# Patient Record
Sex: MALE | Race: WHITE | Employment: UNEMPLOYED | URBAN - METROPOLITAN AREA
[De-identification: names, ages, dates, MRNs, and addresses within clinical notes are randomized per-mention and may not be internally consistent; named-entity substitution may affect disease eponyms.]

---

## 2017-10-01 ENCOUNTER — APPOINTMENT (OUTPATIENT)
Dept: GENERAL RADIOLOGY | Age: 47
End: 2017-10-01
Attending: NURSE PRACTITIONER
Payer: SELF-PAY

## 2017-10-01 ENCOUNTER — HOSPITAL ENCOUNTER (EMERGENCY)
Age: 47
Discharge: HOME | End: 2017-10-01
Attending: EMERGENCY MEDICINE
Payer: SELF-PAY

## 2017-10-01 VITALS
WEIGHT: 193.5 LBS | DIASTOLIC BLOOD PRESSURE: 84 MMHG | HEART RATE: 97 BPM | TEMPERATURE: 97.5 F | SYSTOLIC BLOOD PRESSURE: 128 MMHG | OXYGEN SATURATION: 98 % | HEIGHT: 70 IN | RESPIRATION RATE: 20 BRPM | BODY MASS INDEX: 27.7 KG/M2

## 2017-10-01 DIAGNOSIS — S30.0XXA CONTUSION OF LOWER BACK, INITIAL ENCOUNTER: Primary | ICD-10-CM

## 2017-10-01 PROCEDURE — 96372 THER/PROPH/DIAG INJ SC/IM: CPT

## 2017-10-01 PROCEDURE — 72100 X-RAY EXAM L-S SPINE 2/3 VWS: CPT

## 2017-10-01 PROCEDURE — 74011250637 HC RX REV CODE- 250/637: Performed by: NURSE PRACTITIONER

## 2017-10-01 PROCEDURE — 74011250636 HC RX REV CODE- 250/636: Performed by: NURSE PRACTITIONER

## 2017-10-01 PROCEDURE — 99283 EMERGENCY DEPT VISIT LOW MDM: CPT

## 2017-10-01 RX ORDER — HYDROMORPHONE HCL IN 0.9% NACL 50 MG/50ML
1 PLASTIC BAG, INJECTION (ML) INJECTION
Status: COMPLETED | OUTPATIENT
Start: 2017-10-01 | End: 2017-10-01

## 2017-10-01 RX ORDER — CYCLOBENZAPRINE HCL 10 MG
5 TABLET ORAL
Qty: 20 TAB | Refills: 0 | Status: SHIPPED | OUTPATIENT
Start: 2017-10-01 | End: 2019-04-21

## 2017-10-01 RX ORDER — CITALOPRAM 10 MG/1
20 TABLET ORAL DAILY
COMMUNITY
End: 2019-06-04

## 2017-10-01 RX ORDER — OXYCODONE AND ACETAMINOPHEN 5; 325 MG/1; MG/1
1 TABLET ORAL
Qty: 16 TAB | Refills: 0 | Status: SHIPPED | OUTPATIENT
Start: 2017-10-01 | End: 2019-04-21

## 2017-10-01 RX ORDER — CYCLOBENZAPRINE HCL 10 MG
10 TABLET ORAL
Status: COMPLETED | OUTPATIENT
Start: 2017-10-01 | End: 2017-10-01

## 2017-10-01 RX ORDER — ONDANSETRON 8 MG/1
8 TABLET, ORALLY DISINTEGRATING ORAL
Status: COMPLETED | OUTPATIENT
Start: 2017-10-01 | End: 2017-10-01

## 2017-10-01 RX ADMIN — ONDANSETRON 8 MG: 8 TABLET, ORALLY DISINTEGRATING ORAL at 12:09

## 2017-10-01 RX ADMIN — Medication 1 MG: at 12:10

## 2017-10-01 RX ADMIN — CYCLOBENZAPRINE HYDROCHLORIDE 10 MG: 10 TABLET, FILM COATED ORAL at 12:09

## 2017-10-01 NOTE — DISCHARGE INSTRUCTIONS
Low Back Contusion: Care Instructions  Your Care Instructions  Contusion is the medical term for a bruise. When you have a low back bruise, it's often caused by a direct blow or an impact, such as falling against a counter or table. Bruises are common sports injuries. Most people think of a bruise as a black-and-blue spot. This happens when small blood vessels get torn and leak blood under the skin. But bones, muscles, and organs can also get bruised. If these deep tissues are damaged, you may not always see a bruise. The doctor will examine your bruise. You may also have tests to make sure you do not have a more serious injury, such as a broken bone or nerve damage. Tests may include X-rays or other imaging tests like a CT scan or MRI. Low back bruises may cause pain and swelling. But if there is no serious damage, they will often get better with home treatment in several days to a few weeks. The doctor has checked you carefully, but problems can develop later. If you notice any problems or new symptoms, get medical treatment right away. Follow-up care is a key part of your treatment and safety. Be sure to make and go to all appointments, and call your doctor if you are having problems. It's also a good idea to know your test results and keep a list of the medicines you take. How can you care for yourself at home? · Put ice or a cold pack on the sore area for 10 to 20 minutes at a time to stop swelling. Put a thin cloth between the ice pack and your skin. · Be safe with medicines. Read and follow all instructions on the label. ¨ If the doctor gave you a prescription medicine for pain, take it as prescribed. ¨ If you are not taking a prescription pain medicine, ask your doctor if you can take an over-the-counter medicine. · For the first day or two of pain, take it easy. But as soon as possible, get back to your normal daily life and activities. · Get gentle exercise, such as walking.  Movement keeps your spine flexible and helps your muscles stay strong. When should you call for help? Call 911 anytime you think you may need emergency care. For example, call if:  · You are unable to move a leg at all. Call your doctor now or seek immediate medical care if:  · You have new or worse symptoms in your legs or buttocks. Symptoms may include:  ¨ Numbness or tingling. ¨ Weakness. ¨ Pain. · You lose bladder or bowel control. · You have blood in your urine. Watch closely for changes in your health, and be sure to contact your doctor if:  · You do not get better as expected. Where can you learn more? Go to http://neela-ryan.info/. Enter V337 in the search box to learn more about \"Low Back Contusion: Care Instructions. \"  Current as of: March 20, 2017  Content Version: 11.3  © 5576-8335 [employer]. Care instructions adapted under license by [employer] (which disclaims liability or warranty for this information). If you have questions about a medical condition or this instruction, always ask your healthcare professional. [address] any warranty or liability for your use of this information. [employer] Activation    Thank you for requesting access to [employer]. Please follow the instructions below to securely access and download your online medical record. [employer] allows you to send messages to your doctor, view your test results, renew your prescriptions, schedule appointments, and more. How Do I Sign Up? 1. In your internet browser, go to [email or web address]  2. Click on the First Time User? Click Here link in the Sign In box. You will be redirect to the New Member Sign Up page. 3. Enter your [employer] Access Code exactly as it appears below. You will not need to use this code after youve completed the sign-up process. If you do not sign up before the expiration date, you must request a new code.     [employer] Access Code: [email or web address]  Expires: 2017 12:12 PM (This is the date your [facility] access code will )    4. Enter the last four digits of your Social Security Number (xxxx) and Date of Birth (mm/dd/yyyy) as indicated and click Submit. You will be taken to the next sign-up page. 5. Create a [facility] ID. This will be your [facility] login ID and cannot be changed, so think of one that is secure and easy to remember. 6. Create a [facility] password. You can change your password at any time. 7. Enter your Password Reset Question and Answer. This can be used at a later time if you forget your password. 8. Enter your e-mail address. You will receive e-mail notification when new information is available in [address] 9. Click Sign Up. You can now view and download portions of your medical record. 10. Click the Download Summary menu link to download a portable copy of your medical information. Additional Information    If you have questions, please visit the Frequently Asked Questions section of the [facility] website at [email or web address] Remember, [facility] is NOT to be used for urgent needs. For medical emergencies, dial 911.

## 2017-10-01 NOTE — ED PROVIDER NOTES
HPI Comments: Ike Daugherty is a 50 y.o. Male with PMHx Anxiety, Panic attacks, Rectal Bleeding, fibromyalgia and neuropathy who presents to the ED with c/o lower back pain after falling in the shower this morning at 07:00 AM. Patient verbalizes he slipped while washing his feet. Patient states, \"my lower back hit the back edge of the bathtub. \"   Denies hitting head, LOC, neck pain/stiffness. Admits he took Tylenol for pain, without relief. Denies back pain radiating down leg/legs. Denies loss of bowel/bladder or unsteady gait. Cara CP,SOB, abdominal pain, N/V/D, flank pain, urinary sx's, or any other concerns. The history is provided by the patient. Past Medical History:   Diagnosis Date    Anxiety     Cervicalgia     Fibromyalgia     GERD (gastroesophageal reflux disease)     Lower back pain     and upper    Neuropathy (HCC)     Panic attacks     Rectal bleeding 5-6 years ago    has had cy and EGD by Dr. Natalia Lafleur. CY showed per patient internal hemorrhoids and EGD showed GERD       Past Surgical History:   Procedure Laterality Date    HX APPENDECTOMY      HX LIPOMA RESECTION      HX OTHER SURGICAL      excision of lipoma upper back         Family History:   Problem Relation Age of Onset    Diabetes Mother     Hypertension Mother     Stroke Mother     Coronary Artery Disease Mother     Diabetes Father     Heart Surgery Father      Patient has had heart valve replacement       Social History     Social History    Marital status: LEGALLY      Spouse name: N/A    Number of children: N/A    Years of education: N/A     Occupational History    Not on file. Social History Main Topics    Smoking status: Current Every Day Smoker     Packs/day: 1.00     Years: 30.00    Smokeless tobacco: Never Used      Comment: Pt counseled to stop smoking.     Alcohol use No      Comment: less than social    Drug use: No    Sexual activity: Yes     Partners: Female     Birth control/ protection: None     Other Topics Concern    Not on file     Social History Narrative         ALLERGIES: Pcn [penicillins] and Ibuprofen    Review of Systems   Constitutional: Negative. HENT: Negative. Eyes: Negative. Respiratory: Negative. Cardiovascular: Negative. Gastrointestinal: Negative. Genitourinary: Negative. Musculoskeletal: Positive for back pain. Negative for neck pain and neck stiffness. Skin: Negative. Neurological: Negative. All other systems reviewed and are negative. Vitals:    10/01/17 1130   BP: 128/84   Pulse: 97   Resp: 20   Temp: 97.5 °F (36.4 °C)   SpO2: 98%   Weight: 87.8 kg (193 lb 8 oz)   Height: 5' 10\" (1.778 m)            Physical Exam   Constitutional: He is oriented to person, place, and time. He appears well-developed and well-nourished. No distress. HENT:   Right Ear: Hearing, tympanic membrane, external ear and ear canal normal.   Left Ear: Hearing, tympanic membrane, external ear and ear canal normal.   Nose: Nose normal.   Mouth/Throat: Uvula is midline, oropharynx is clear and moist and mucous membranes are normal. No oropharyngeal exudate. Neck: Normal range of motion and full passive range of motion without pain. Neck supple. Cardiovascular: Normal rate, regular rhythm, normal heart sounds and intact distal pulses. Exam reveals no gallop and no friction rub. No murmur heard. Pulmonary/Chest: Effort normal and breath sounds normal. No respiratory distress. He has no wheezes. He has no rales. He exhibits no tenderness. Abdominal: Soft. Normal appearance and bowel sounds are normal. He exhibits no shifting dullness, no distension and no mass. There is no tenderness. There is no rigidity, no rebound, no guarding, no CVA tenderness, no tenderness at McBurney's point and negative Dominguez's sign. Musculoskeletal: Normal range of motion.         Cervical back: Normal.        Thoracic back: Normal.        Lumbar back: He exhibits tenderness (mild ecchymosis to L3 and L4) and bony tenderness. He exhibits normal range of motion, no swelling and no edema. Negative SLR. No foot drop   Neurological: He is alert and oriented to person, place, and time. Skin: Skin is warm and dry. He is not diaphoretic. Psychiatric: He has a normal mood and affect. His behavior is normal. Judgment and thought content normal.   Nursing note and vitals reviewed. MDM  Number of Diagnoses or Management Options  Contusion of lower back, initial encounter:   Diagnosis management comments: DDX:  Muscular Strain, Spinal Stenosis, Sciatica, Compression FX, DJD, Spondylolysis, Inflammatory Spondyloarthropathy, Cauda Equina Syndrome    Clinical Impression/Plan: Patient stable condition. Reviewed x-ray with patient. Answered questions. Will prescribe Percocet, Flexeril, and refer to Ortho. Follow up with PCP in 2-4 days. If symptoms worsen or have any other concerns, return to ER. Patient verbalizes d/c instructions.         Amount and/or Complexity of Data Reviewed  Tests in the radiology section of CPT®: ordered and reviewed  Review and summarize past medical records: yes    Risk of Complications, Morbidity, and/or Mortality  Presenting problems: low  Diagnostic procedures: low  Management options: low      ED Course       Procedures    Vitals:  Patient Vitals for the past 12 hrs:   Temp Pulse Resp BP SpO2   10/01/17 1130 97.5 °F (36.4 °C) 97 20 128/84 98 %       Medications ordered:   Medications   HYDROmorphone in NS (DILAUDID) injection 1 mg (1 mg IntraMUSCular Given 10/1/17 1210)   cyclobenzaprine (FLEXERIL) tablet 10 mg (10 mg Oral Given 10/1/17 1209)   ondansetron (ZOFRAN ODT) tablet 8 mg (8 mg Oral Given 10/1/17 1209)

## 2017-11-05 ENCOUNTER — APPOINTMENT (OUTPATIENT)
Dept: GENERAL RADIOLOGY | Age: 47
End: 2017-11-05
Attending: NURSE PRACTITIONER
Payer: SELF-PAY

## 2017-11-05 ENCOUNTER — HOSPITAL ENCOUNTER (EMERGENCY)
Age: 47
Discharge: HOME | End: 2017-11-05
Attending: EMERGENCY MEDICINE
Payer: SELF-PAY

## 2017-11-05 VITALS
RESPIRATION RATE: 22 BRPM | OXYGEN SATURATION: 96 % | SYSTOLIC BLOOD PRESSURE: 140 MMHG | DIASTOLIC BLOOD PRESSURE: 94 MMHG | TEMPERATURE: 97.5 F | HEART RATE: 107 BPM

## 2017-11-05 DIAGNOSIS — G89.29 OTHER CHRONIC PAIN: Primary | ICD-10-CM

## 2017-11-05 PROCEDURE — 99282 EMERGENCY DEPT VISIT SF MDM: CPT

## 2017-11-05 RX ORDER — ONDANSETRON 2 MG/ML
4 INJECTION INTRAMUSCULAR; INTRAVENOUS
Status: DISCONTINUED | OUTPATIENT
Start: 2017-11-05 | End: 2017-11-05 | Stop reason: HOSPADM

## 2017-11-05 RX ORDER — MORPHINE SULFATE 8 MG/ML
4 INJECTION, SOLUTION INTRAMUSCULAR; INTRAVENOUS
Status: DISCONTINUED | OUTPATIENT
Start: 2017-11-05 | End: 2017-11-05 | Stop reason: HOSPADM

## 2017-11-05 NOTE — DISCHARGE INSTRUCTIONS
Chronic Pain: Care Instructions  Your Care Instructions    Chronic pain is pain that lasts a long time (months or even years) and may or may not have a clear cause. It is different from acute pain, which usually does have a clear cause-like an injury or illness-and gets better over time. Chronic pain:  · Lasts over time but may vary from day to day. · Does not go away despite efforts to end it. · May disrupt your sleep and lead to fatigue. · May cause depression or anxiety. · May make your muscles tense, causing more pain. · Can disrupt your work, hobbies, home life, and relationships with friends and family. Chronic pain is a very real condition. It is not just in your head. Treatment can help and usually includes several methods used together, such as medicines, physical therapy, exercise, and other treatments. Learning how to relax and changing negative thought patterns can also help you cope. Chronic pain is complex. Taking an active role in your treatment will help you better manage your pain. Tell your doctor if you have trouble dealing with your pain. You may have to try several things before you find what works best for you. Follow-up care is a key part of your treatment and safety. Be sure to make and go to all appointments, and call your doctor if you are having problems. It's also a good idea to know your test results and keep a list of the medicines you take. How can you care for yourself at home? · Pace yourself. Break up large jobs into smaller tasks. Save harder tasks for days when you have less pain, or go back and forth between hard tasks and easier ones. Take rest breaks. · Relax, and reduce stress. Relaxation techniques such as deep breathing or meditation can help. · Keep moving. Gentle, daily exercise can help reduce pain over the long run. Try low- or no-impact exercises such as walking, swimming, and stationary biking. Do stretches to stay flexible.   · Try heat, cold packs, and massage. · Get enough sleep. Chronic pain can make you tired and drain your energy. Talk with your doctor if you have trouble sleeping because of pain. · Think positive. Your thoughts can affect your pain level. Do things that you enjoy to distract yourself when you have pain instead of focusing on the pain. See a movie, read a book, listen to music, or spend time with a friend. · If you think you are depressed, talk to your doctor about treatment. · Keep a daily pain diary. Record how your moods, thoughts, sleep patterns, activities, and medicine affect your pain. You may find that your pain is worse during or after certain activities or when you are feeling a certain emotion. Having a record of your pain can help you and your doctor find the best ways to treat your pain. · Take pain medicines exactly as directed. ¨ If the doctor gave you a prescription medicine for pain, take it as prescribed. ¨ If you are not taking a prescription pain medicine, ask your doctor if you can take an over-the-counter medicine. Reducing constipation caused by pain medicine  · Include fruits, vegetables, beans, and whole grains in your diet each day. These foods are high in fiber. · Drink plenty of fluids, enough so that your urine is light yellow or clear like water. If you have kidney, heart, or liver disease and have to limit fluids, talk with your doctor before you increase the amount of fluids you drink. · If your doctor recommends it, get more exercise. Walking is a good choice. Bit by bit, increase the amount you walk every day. Try for at least 30 minutes on most days of the week. · Schedule time each day for a bowel movement. A daily routine may help. Take your time and do not strain when having a bowel movement. When should you call for help? Call your doctor now or seek immediate medical care if:  ? · Your pain gets worse or is out of control.    ? · You feel down or blue, or you do not enjoy things like you once did. You may be depressed, which is common in people with chronic pain. Depression can be treated. ? · You have vomiting or cramps for more than 2 hours. ? Watch closely for changes in your health, and be sure to contact your doctor if:  ? · You cannot sleep because of pain. ? · You are very worried or anxious about your pain. ? · You have trouble taking your pain medicine. ? · You have any concerns about your pain medicine. ? · You have trouble with bowel movements, such as:  ¨ No bowel movement in 3 days. ¨ Blood in the anal area, in your stool, or on the toilet paper. ¨ Diarrhea for more than 24 hours. Where can you learn more? Go to http://neela-ryan.info/. Enter N004 in the search box to learn more about \"Chronic Pain: Care Instructions. \"  Current as of: October 14, 2016  Content Version: 11.4  © 3746-4946 [employer]. Care instructions adapted under license by [employer] (which disclaims liability or warranty for this information). If you have questions about a medical condition or this instruction, always ask your healthcare professional. [place] [address] any warranty or liability for your use of this information.

## 2017-11-05 NOTE — ED TRIAGE NOTES
Pt arrived to ED with c/o neck, back, and hip pain after falling off of ladder approximately 8 feet in the air while cleaning gutters. Pt states that he fell onto an air conditioner unit and the ladder then fell on top of him, hitting his back.   Pt has history of chronic back pain, with bulging disk at C3, C4, C5, L5 S1.

## 2017-11-05 NOTE — ED PROVIDER NOTES
HPI Comments: Pt with a history of frequent visits to emergency departments per pt to get pain meds since he left his pain management in February. Pt states he fell yesterday while cleaning the gutters at his house. Pt has been in the room walking around in no distress, no limp. Patient is a 50 y.o. male presenting with back pain. The history is provided by the patient. No  was used. Back Pain    This is a chronic problem. The current episode started more than 1 week ago. The problem has not changed since onset. The problem occurs constantly. Patient reports not work related injury. The pain is associated with a fall. The pain is present in the middle back. The pain does not radiate. The pain is at a severity of 8/10 (no grimacing moving without difficulty). The pain is mild. The symptoms are aggravated by bending. Pertinent negatives include no chest pain, no fever, no numbness, no headaches and no dysuria. He has tried nothing for the symptoms. Past Medical History:   Diagnosis Date    Anxiety     Cervicalgia     Fibromyalgia     GERD (gastroesophageal reflux disease)     Lower back pain     and upper    Neuropathy     Panic attacks     Rectal bleeding 5-6 years ago    has had cy and EGD by Dr. Pito Lazcano. CY showed per patient internal hemorrhoids and EGD showed GERD       Past Surgical History:   Procedure Laterality Date    HX APPENDECTOMY      HX LIPOMA RESECTION      HX OTHER SURGICAL      excision of lipoma upper back         Family History:   Problem Relation Age of Onset    Diabetes Mother     Hypertension Mother     Stroke Mother     Coronary Artery Disease Mother     Diabetes Father     Heart Surgery Father      Patient has had heart valve replacement       Social History     Social History    Marital status: LEGALLY      Spouse name: N/A    Number of children: N/A    Years of education: N/A     Occupational History    Not on file.      Social History Main Topics    Smoking status: Current Every Day Smoker     Packs/day: 1.00     Years: 30.00    Smokeless tobacco: Never Used      Comment: Pt counseled to stop smoking.  Alcohol use No      Comment: less than social    Drug use: No    Sexual activity: Yes     Partners: Female     Birth control/ protection: None     Other Topics Concern    Not on file     Social History Narrative         ALLERGIES: Pcn [penicillins] and Ibuprofen    Review of Systems   Constitutional: Negative for chills and fever. Respiratory: Negative for shortness of breath. Cardiovascular: Negative for chest pain. Genitourinary: Negative for dysuria, frequency and hematuria. Musculoskeletal: Positive for back pain. Negative for gait problem, joint swelling, neck pain and neck stiffness. Neurological: Negative for dizziness, seizures, numbness and headaches. All other systems reviewed and are negative. Vitals:    11/05/17 0137   BP: (!) 140/94   Pulse: (!) 107   Resp: 22   Temp: 97.5 °F (36.4 °C)   SpO2: 96%            Physical Exam   Constitutional: He is oriented to person, place, and time. He appears well-developed and well-nourished. HENT:   Head: Normocephalic and atraumatic. Eyes: Conjunctivae and EOM are normal. Pupils are equal, round, and reactive to light. Neck: Normal range of motion. Neck supple. Cardiovascular: Normal rate and regular rhythm. Pulmonary/Chest: Effort normal and breath sounds normal.   Abdominal: Soft. Bowel sounds are normal.   Musculoskeletal: Normal range of motion. He exhibits tenderness. Back:    Tender to mid back area, small faint bruising to posterior lateral ribs 11/12   Neurological: He is alert and oriented to person, place, and time. He has normal reflexes. Skin: Skin is warm and dry. Psychiatric: He has a normal mood and affect. His behavior is normal. Judgment and thought content normal.   Nursing note and vitals reviewed.        MDM  Number of Diagnoses or Management Options  Other chronic pain: minor  Diagnosis management comments: Pt with a history of chronic pain and repeated ER visits here, [place] and pt states in [place] [address]. Pt states he stopped going to his pain management in Feb because it was too expensive and he has been going to [facility] for his pain meds since. Pt was informed of the narcotic policy and that the ED has a zero tolerance for prescribing narcotics for chronic pain and I would be able to treat him in the ED xray his ribs but I would not be able to provide an rx for controlled substance. Pt agreed and I left the room after exam.  No long after the pt pulled his iv, and stated that he did not want xrays and he was leaving. I discharged the pt with instructions to follow up with [facility]. Pt refused the pain med and xray. Since he was ambulating in no distress, no limp no grimacing in pain and exam was normal except a faint bruise to his mid back I fell discharge and follow up is appropriate. Pt ambulated in no distress and quickly from the ed. Also according to [place] PNP pt has multiple rx for controlled substance from multiple providers in the past few months.  This confirms his accounting of going to multiple EDs for pain meds        Amount and/or Complexity of Data Reviewed  Tests in the radiology section of CPT®: ordered    Risk of Complications, Morbidity, and/or Mortality  Presenting problems: minimal  Diagnostic procedures: minimal  Management options: minimal    Patient Progress  Patient progress: stable    ED Course       Procedures            Vitals:  Patient Vitals for the past 12 hrs:   Temp Pulse Resp BP SpO2   11/05/17 0137 97.5 °F (36.4 °C) (!) 107 22 (!) 140/94 96 %       Medications ordered:   Medications   morphine injection 4 mg (not administered)   ondansetron (ZOFRAN) injection 4 mg (not administered)               X-Ray, CT or other radiology findings or impressions:  XR RIBS RT W PA CXR MIN 3 V    (Results Pending)     refused         Reevaluation of patient:   I have reassessed the patient. Patient is feeling better and is asking to go home without xray or meds    Disposition:    Diagnosis:   1. Other chronic pain        Disposition: to Home      Follow-up Information     Follow up With Details Comments [address] In 2 days  [address] [address]  [phone]           Discharge Medication List as of 11/5/2017  4:16 AM      CONTINUE these medications which have NOT CHANGED    Details   oxyCODONE-acetaminophen (PERCOCET) 5-325 mg per tablet Take 1 Tab by mouth every four (4) hours as needed for Pain. Max Daily Amount: 6 Tabs., Print, Disp-16 Tab, R-0      cyclobenzaprine (FLEXERIL) 10 mg tablet Take 0.5 Tabs by mouth three (3) times daily as needed for Muscle Spasm(s). , Print, Disp-20 Tab, R-0      citalopram (CELEXA) 10 mg tablet Take 20 mg by mouth daily. , Historical Med      ALPRAZolam (XANAX) 1 mg tablet Take 1 Tab by mouth three (3) times daily as needed for Anxiety. Max Daily Amount: 3 mg., Print, Disp-42 Tab, R-0      DULoxetine (CYMBALTA) 20 mg capsule Take 30 mg by mouth daily. , Historical Med             Return to the ER if you are unable to obtain referral as directed. Dalton Tom's  results have been reviewed with him. He has been counseled regarding his diagnosis, treatment, and plan. He verbally conveys understanding and agreement of the signs, symptoms, diagnosis, treatment and prognosis and additionally agrees to follow up as discussed. He also agrees with the care-plan and conveys that all of his questions have been answered. I have also provided discharge instructions for him that include: educational information regarding their diagnosis and treatment, and list of reasons why they would want to return to the ED prior to their follow-up appointment, should his condition change.       Amita Gray FNP-C

## 2017-11-05 NOTE — ED NOTES
Pt attempting to elope at this time, found existing the front entrance. Pt pulled out his IV before attempting to leave, pt returned to room 5, clean dry bandage applied to IV site. Pt upset about non-opioid policy for his chronic neck and back pain. Pt was uncooperative, and refused medication and xray. Pt wants to be discharged Osmar Lee NP notified, returned to bedside to speak with pt, pt states he wants to leave. Pt discharged, ambulated without any distress.

## 2019-04-21 ENCOUNTER — HOSPITAL ENCOUNTER (EMERGENCY)
Age: 49
Discharge: HOME | End: 2019-04-21
Attending: EMERGENCY MEDICINE | Admitting: EMERGENCY MEDICINE
Payer: MEDICAID

## 2019-04-21 VITALS
HEART RATE: 107 BPM | DIASTOLIC BLOOD PRESSURE: 82 MMHG | OXYGEN SATURATION: 98 % | RESPIRATION RATE: 16 BRPM | SYSTOLIC BLOOD PRESSURE: 121 MMHG | TEMPERATURE: 97.7 F

## 2019-04-21 DIAGNOSIS — H72.92 TYMPANIC MEMBRANE RUPTURE, LEFT: Primary | ICD-10-CM

## 2019-04-21 PROCEDURE — 99282 EMERGENCY DEPT VISIT SF MDM: CPT

## 2019-04-21 RX ORDER — CEFDINIR 300 MG/1
300 CAPSULE ORAL 2 TIMES DAILY
Qty: 14 CAP | Refills: 0 | Status: SHIPPED | OUTPATIENT
Start: 2019-04-21 | End: 2019-04-28

## 2019-04-21 RX ORDER — TRAMADOL HYDROCHLORIDE 50 MG/1
50 TABLET ORAL
Qty: 10 TAB | Refills: 0 | Status: SHIPPED | OUTPATIENT
Start: 2019-04-21 | End: 2019-04-26

## 2019-04-21 RX ORDER — ACETAMINOPHEN 325 MG/1
650 TABLET ORAL
Qty: 20 TAB | Refills: 0 | Status: SHIPPED | OUTPATIENT
Start: 2019-04-21 | End: 2019-12-16

## 2019-04-21 NOTE — ED PROVIDER NOTES
2:11 PM  
46 y.o. male presents to ED C/O left ear pain. She reports this morning he sneezed and felt a pop in his left ear, excruciating pain occurred after the pop and any noted blood from his left ear. Patient reports his equilibrium also feels a little bit off since this occurred. Patient reports decreased hearing from left ear. Patient reports for the last couple days he is felt like fluid was sloshing around his left ear. Patient reports he smokes 1 pack/day. Patient denies any other symptoms or complaints. Past Medical History:  
Diagnosis Date  Anxiety  Cervicalgia  Fibromyalgia  GERD (gastroesophageal reflux disease)  Lower back pain   
 and upper  Neuropathy  Panic attacks  Rectal bleeding 5-6 years ago  
 has had cy and EGD by Dr. Pito Lazcano. CY showed per patient internal hemorrhoids and EGD showed GERD Past Surgical History:  
Procedure Laterality Date  HX APPENDECTOMY  HX LIPOMA RESECTION    
 HX OTHER SURGICAL    
 excision of lipoma upper back Family History:  
Problem Relation Age of Onset  Diabetes Mother  Hypertension Mother  Stroke Mother  Coronary Artery Disease Mother  Diabetes Father  Heart Surgery Father Patient has had heart valve replacement Social History Socioeconomic History  Marital status: LEGALLY  Spouse name: Not on file  Number of children: Not on file  Years of education: Not on file  Highest education level: Not on file Occupational History  Not on file Social Needs  Financial resource strain: Not on file  Food insecurity:  
  Worry: Not on file Inability: Not on file  Transportation needs:  
  Medical: Not on file Non-medical: Not on file Tobacco Use  Smoking status: Current Every Day Smoker Packs/day: 1.00 Years: 30.00 Pack years: 30.00  Smokeless tobacco: Never Used  Tobacco comment: Pt counseled to stop smoking. Substance and Sexual Activity  Alcohol use: No  
  Alcohol/week: 0.0 oz  
  Comment: less than social  
 Drug use: No  
 Sexual activity: Yes  
  Partners: Female Birth control/protection: None Lifestyle  Physical activity:  
  Days per week: Not on file Minutes per session: Not on file  Stress: Not on file Relationships  Social connections:  
  Talks on phone: Not on file Gets together: Not on file Attends Congregation service: Not on file Active member of club or organization: Not on file Attends meetings of clubs or organizations: Not on file Relationship status: Not on file  Intimate partner violence:  
  Fear of current or ex partner: Not on file Emotionally abused: Not on file Physically abused: Not on file Forced sexual activity: Not on file Other Topics Concern  Not on file Social History Narrative  Not on file ALLERGIES: Pcn [penicillins] and Ibuprofen Review of Systems Constitutional: Negative for activity change, appetite change, chills, fatigue and fever. HENT: Positive for ear discharge and ear pain. Negative for congestion, rhinorrhea and sore throat. Eyes: Negative for pain, discharge, redness and itching. Respiratory: Negative for cough, chest tightness, shortness of breath and wheezing. Cardiovascular: Negative for chest pain and palpitations. Gastrointestinal: Negative for abdominal pain, blood in stool, constipation, diarrhea, nausea and vomiting. Endocrine: Negative for polyuria. Genitourinary: Negative for discharge, dysuria, flank pain, hematuria, penile pain and testicular pain. Musculoskeletal: Negative for back pain, joint swelling and neck pain. Skin: Negative for rash and wound. Allergic/Immunologic: Negative for immunocompromised state. Neurological: Negative for dizziness, weakness, light-headedness, numbness and headaches. Hematological: Negative for adenopathy. Psychiatric/Behavioral: Negative for agitation and confusion. The patient is not nervous/anxious. All other systems reviewed and are negative. Vitals:  
 04/21/19 1413 BP: 121/82 Pulse: (!) 107 Resp: 16 Temp: 97.7 °F (36.5 °C) SpO2: 98% Physical Exam  
Constitutional: He is oriented to person, place, and time. He appears well-developed and well-nourished. patient appears mildly uncomfortable. HENT:  
Head: Atraumatic. Right Ear: Hearing, tympanic membrane, external ear and ear canal normal. No mastoid tenderness. Left Ear: No mastoid tenderness. Decreased hearing is noted. Ears: 
 
Mouth/Throat: Uvula is midline and oropharynx is clear and moist.  
Neck: Normal range of motion. Cardiovascular: Normal rate, regular rhythm and intact distal pulses. Pulmonary/Chest: Effort normal and breath sounds normal. No stridor. No respiratory distress. He has no wheezes. He has no rales. Musculoskeletal: Normal range of motion. Neurological: He is alert and oriented to person, place, and time. He exhibits normal muscle tone. Coordination normal.  
Skin: Skin is warm and dry. He is not diaphoretic. No erythema. Nursing note and vitals reviewed. MDM Number of Diagnoses or Management Options Tympanic membrane rupture, left:  
Diagnosis management comments: Clinical impression: Tympanic rupture Physical exam is consistent with left tympanic rupture probably secondary to acute otitis media due to patient reporting feeling like fluid was sloshing around his inner ear for last couple days. Will discharge with p.o. antibiotics, discussed the importance of keeping water out of his ear. Short course of pain medication as needed. Patient referred to otolaryngology for further evaluation as needed. Patient educated to return to the ED for any worsening symptoms. Patient denies questions. Procedures RESULTS: 
 
 No orders to display Labs Reviewed - No data to display No results found for this or any previous visit (from the past 12 hour(s)). PROGRESS NOTE:  
2:12 PM  
Initial assessment completed. Written by Parth HERNANDEZ 
 
DISCHARGE NOTE: 
2:37 PM  
Dalton Tom's  results have been reviewed with him. He has been counseled regarding his diagnosis, treatment, and plan. He verbally conveys understanding and agreement of the signs, symptoms, diagnosis, treatment and prognosis and additionally agrees to follow up as discussed. He also agrees with the care-plan and conveys that all of his questions have been answered. I have also provided discharge instructions for him that include: educational information regarding their diagnosis and treatment, and list of reasons why they would want to return to the ED prior to their follow-up appointment, should his condition change. CLINICAL IMPRESSION: 
 
1. Tympanic membrane rupture, left AFTER VISIT PLAN: 
 
Current Discharge Medication List  
  
START taking these medications Details  
cefdinir (OMNICEF) 300 mg capsule Take 1 Cap by mouth two (2) times a day for 7 days. Qty: 14 Cap, Refills: 0  
  
acetaminophen (TYLENOL) 325 mg tablet Take 2 Tabs by mouth every four (4) hours as needed for Pain. Qty: 20 Tab, Refills: 0  
  
traMADol (ULTRAM) 50 mg tablet Take 1 Tab by mouth every six (6) hours as needed for Pain for up to 5 days. Max Daily Amount: 200 mg. Qty: 10 Tab, Refills: 0 Associated Diagnoses: Tympanic membrane rupture, left STOP taking these medications  
  
 oxyCODONE-acetaminophen (PERCOCET) 5-325 mg per tablet Comments:  
Reason for Stopping:   
   
 cyclobenzaprine (FLEXERIL) 10 mg tablet Comments:  
Reason for Stopping: Follow-up Information Follow up With Specialties Details Why Contact Info  [facility] Department of Otolaryngology  Schedule an appointment as soon as possible for a visit in 3 days Further evaluation [address] [facility] 
[phone] [facility]  Schedule an appointment as soon as possible for a visit in 3 days Further evaluation [address] [facility] [address] [facility] 
[phone] Written by Sophy ABDULC

## 2019-04-21 NOTE — DISCHARGE INSTRUCTIONS
Patient Education        Keeping Ears Dry: Care Instructions  Your Care Instructions  Your doctor wants you to keep water from getting into your ears. You may need to do this because of a ruptured eardrum, an ear infection, or other ear problems. Follow-up care is a key part of your treatment and safety. Be sure to make and go to all appointments, and call your doctor if you are having problems. It's also a good idea to know your test results and keep a list of the medicines you take. How can you care for yourself at home? · Take baths until your doctor says you can take showers again. Avoid getting water in the ear until after the problem clears up. Ask your doctor if you should use earplugs to keep water out of your ears. · Do not swim until your doctor says you can. · If you get water in your ears, turn your head to each side and pull the earlobe in different directions. This will help the water run out. If your ears are still wet, use a hair dryer set on the lowest heat. Hold the dryer several inches from your ear. · Use your medicines exactly as prescribed. Call your doctor if you think you are having a problem with your medicine. Do not put drops in your ears unless your doctor prescribes them. When should you call for help? Watch closely for changes in your health, and be sure to contact your doctor if you have any problems. Where can you learn more? Go to http://neela-ryan.info/. Enter A256 in the search box to learn more about \"Keeping Ears Dry: Care Instructions. \"  Current as of: March 27, 2018  Content Version: 11.9  © 9258-6965 [employer]. Care instructions adapted under license by [employer] (which disclaims liability or warranty for this information).  If you have questions about a medical condition or this instruction, always ask your healthcare professional. [place] [address] any warranty or liability for your use of this information. Patient Education        Perforated Eardrum: Care Instructions  Your Care Instructions    A tear or hole in the membrane of the middle ear is called a perforated or ruptured eardrum. This can happen if an infection builds up inside the ear or if the eardrum gets injured. You may find it hard to hear out of that ear or may hear a buzzing sound. You may have an earache or have fluids that drain from the ear. Your eardrum should heal on its own in a few weeks, and you should hear normally then. If you have an infection, your doctor may prescribe antibiotics. You may need pain relief medicine for your earache. Your doctor will check to see if your eardrum has healed. If not, you may need surgery to repair the eardrum. Follow-up care is a key part of your treatment and safety. Be sure to make and go to all appointments, and call your doctor if you are having problems. It's also a good idea to know your test results and keep a list of the medicines you take. How can you care for yourself at home? · If your doctor prescribed antibiotics, take them as directed. Do not stop taking them just because you feel better. You need to take the full course of antibiotics. · Take an over-the-counter pain medicine, such as acetaminophen (Tylenol), ibuprofen (Advil, Motrin), or naproxen (Aleve), as needed. Read and follow all instructions on the label. · Do not take two or more pain medicines at the same time unless the doctor told you to. Many pain medicines have acetaminophen, which is Tylenol. Too much acetaminophen (Tylenol) can be harmful. · To ease pain, put a warm washcloth or a heating pad set on low on your ear. You may have some drainage from the ear. · Be careful when taking over-the-counter cold or flu medicines and Tylenol at the same time. Many of these medicines have acetaminophen, which is Tylenol. Read the labels to make sure that you are not taking more than the recommended dose.  Too much Tylenol can be harmful. · Keep your ears dry. ? Take baths until your doctor says you can take showers again. ? When you wash your hair, use cotton lightly coated with petroleum jelly as an earplug. Do not use plastic earplugs. ? Do not swim until your doctor says you can.  ? If you get water in your ears, turn your head to each side and pull the earlobe in different directions. This will help the water run out. If your ears are still wet, use a hair dryer set on the lowest heat. Hold the dryer several inches from your ear. · Do not put anything into your ear canal. For example, do not use a cotton swab to clean the inside of your ear. It can damage your ear. If you think you have something inside your ear, ask your doctor to check it. When should you call for help? Call your doctor now or seek immediate medical care if:    · You have signs of infection, such as:  ? Increased pain, swelling, warmth, or redness. ? Pus draining from the ear. ? A fever.    Watch closely for changes in your health, and be sure to contact your doctor if:    · You have changes in hearing.     · You do not get better as expected. Where can you learn more? Go to http://neela-ryan.info/. Enter H121 in the search box to learn more about \"Perforated Eardrum: Care Instructions. \"  Current as of: March 27, 2018  Content Version: 11.9  © 6360-0111 [employer]. Care instructions adapted under license by [employer] (which disclaims liability or warranty for this information). If you have questions about a medical condition or this instruction, always ask your healthcare professional. [place] [address] any warranty or liability for your use of this information.

## 2019-06-04 ENCOUNTER — APPOINTMENT (OUTPATIENT)
Dept: GENERAL RADIOLOGY | Age: 49
End: 2019-06-04
Attending: EMERGENCY MEDICINE
Payer: MEDICAID

## 2019-06-04 ENCOUNTER — HOSPITAL ENCOUNTER (EMERGENCY)
Age: 49
Discharge: HOME | End: 2019-06-04
Attending: EMERGENCY MEDICINE
Payer: MEDICAID

## 2019-06-04 VITALS
RESPIRATION RATE: 17 BRPM | WEIGHT: 165 LBS | DIASTOLIC BLOOD PRESSURE: 64 MMHG | HEIGHT: 70 IN | OXYGEN SATURATION: 99 % | HEART RATE: 88 BPM | SYSTOLIC BLOOD PRESSURE: 115 MMHG | BODY MASS INDEX: 23.62 KG/M2 | TEMPERATURE: 98.8 F

## 2019-06-04 DIAGNOSIS — M79.602 PAIN OF LEFT UPPER EXTREMITY: ICD-10-CM

## 2019-06-04 DIAGNOSIS — S83.92XA SPRAIN OF LEFT KNEE, UNSPECIFIED LIGAMENT, INITIAL ENCOUNTER: Primary | ICD-10-CM
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
## 2019-06-04 PROCEDURE — 71046 X-RAY EXAM CHEST 2 VIEWS: CPT

## 2019-06-04 PROCEDURE — 73080 X-RAY EXAM OF ELBOW: CPT

## 2019-06-04 PROCEDURE — 74011250636 HC RX REV CODE- 250/636: Performed by: EMERGENCY MEDICINE

## 2019-06-04 PROCEDURE — 96372 THER/PROPH/DIAG INJ SC/IM: CPT

## 2019-06-04 PROCEDURE — 99284 EMERGENCY DEPT VISIT MOD MDM: CPT

## 2019-06-04 PROCEDURE — 74011250637 HC RX REV CODE- 250/637: Performed by: EMERGENCY MEDICINE

## 2019-06-04 PROCEDURE — 73030 X-RAY EXAM OF SHOULDER: CPT

## 2019-06-04 PROCEDURE — 73564 X-RAY EXAM KNEE 4 OR MORE: CPT

## 2019-06-04 RX ORDER — HYDROCODONE BITARTRATE AND ACETAMINOPHEN 5; 325 MG/1; MG/1
1 TABLET ORAL
Qty: 12 TAB | Refills: 0 | Status: SHIPPED | OUTPATIENT
Start: 2019-06-04 | End: 2019-06-07

## 2019-06-04 RX ORDER — CYCLOBENZAPRINE HCL 10 MG
10 TABLET ORAL
Qty: 15 TAB | Refills: 0 | Status: SHIPPED | OUTPATIENT
Start: 2019-06-04 | End: 2019-12-16

## 2019-06-04 RX ORDER — HYDROMORPHONE HYDROCHLORIDE 1 MG/ML
1 INJECTION, SOLUTION INTRAMUSCULAR; INTRAVENOUS; SUBCUTANEOUS
Status: COMPLETED | OUTPATIENT
Start: 2019-06-04 | End: 2019-06-04

## 2019-06-04 RX ORDER — DIAZEPAM 5 MG/1
5 TABLET ORAL
Status: COMPLETED | OUTPATIENT
Start: 2019-06-04 | End: 2019-06-04

## 2019-06-04 RX ADMIN — HYDROMORPHONE HYDROCHLORIDE 1 MG: 1 INJECTION, SOLUTION INTRAMUSCULAR; INTRAVENOUS; SUBCUTANEOUS at 17:01

## 2019-06-04 RX ADMIN — DIAZEPAM 5 MG: 5 TABLET ORAL at 18:47

## 2019-06-04 NOTE — ED NOTES
I have reviewed discharge instructions with the patient. The patient verbalized understanding. Patient NAD, VSS and pain is 5/10 at discharge. Patient armband removed and shredded.

## 2019-06-04 NOTE — ED TRIAGE NOTES
1 hour ago fell 8 feet off ladder onto left side. Left shoulder , elbow and hip and back pain. denies LOC.  Drove self here

## 2019-06-04 NOTE — ED PROVIDER NOTES
58-year-old male was on a ladder fell 8 feet on his left side primarily his left shoulder left elbow complains of left elbow shoulder left sided rib pain left knee pain denies any chest pain shortness of breath fevers chills nausea vomiting or neurologic deficits he has full range of motion and drove himself here           Past Medical History:   Diagnosis Date    Anxiety     Cervicalgia     Fibromyalgia     GERD (gastroesophageal reflux disease)     Lower back pain     and upper    Neuropathy     Panic attacks     Rectal bleeding 5-6 years ago    has had cy and EGD by Dr. Ceilna Barton. CY showed per patient internal hemorrhoids and EGD showed GERD       Past Surgical History:   Procedure Laterality Date    HX APPENDECTOMY      HX LIPOMA RESECTION      HX OTHER SURGICAL      excision of lipoma upper back         Family History:   Problem Relation Age of Onset    Diabetes Mother     Hypertension Mother     Stroke Mother     Coronary Artery Disease Mother     Diabetes Father     Heart Surgery Father         Patient has had heart valve replacement       Social History     Socioeconomic History    Marital status: LEGALLY      Spouse name: Not on file    Number of children: Not on file    Years of education: Not on file    Highest education level: Not on file   Occupational History    Not on file   Social Needs    Financial resource strain: Not on file    Food insecurity:     Worry: Not on file     Inability: Not on file    Transportation needs:     Medical: Not on file     Non-medical: Not on file   Tobacco Use    Smoking status: Current Every Day Smoker     Packs/day: 1.00     Years: 30.00     Pack years: 30.00    Smokeless tobacco: Never Used    Tobacco comment: Pt counseled to stop smoking.    Substance and Sexual Activity    Alcohol use: No     Alcohol/week: 0.0 oz     Comment: less than social    Drug use: No    Sexual activity: Yes     Partners: Female     Birth control/protection: None   Lifestyle    Physical activity:     Days per week: Not on file     Minutes per session: Not on file    Stress: Not on file   Relationships    Social connections:     Talks on phone: Not on file     Gets together: Not on file     Attends Restorationism service: Not on file     Active member of club or organization: Not on file     Attends meetings of clubs or organizations: Not on file     Relationship status: Not on file    Intimate partner violence:     Fear of current or ex partner: Not on file     Emotionally abused: Not on file     Physically abused: Not on file     Forced sexual activity: Not on file   Other Topics Concern    Not on file   Social History Narrative    Not on file         ALLERGIES: Pcn [penicillins] and Ibuprofen    Review of Systems   Constitutional: Negative for chills and fever. Respiratory: Negative for shortness of breath. Cardiovascular: Negative for chest pain. Gastrointestinal: Negative for abdominal pain. Musculoskeletal: Positive for myalgias. Negative for back pain, neck pain and neck stiffness. Skin: Negative for rash and wound. Neurological: Negative for weakness. Vitals:    06/04/19 1638   BP: 122/78   Pulse: 88   Resp: 17   Temp: 98.8 °F (37.1 °C)   SpO2: 100%   Weight: 74.8 kg (165 lb)   Height: 5' 10\" (1.778 m)            Physical Exam   Constitutional: He appears well-developed and well-nourished. No distress. Cardiovascular: Normal rate and regular rhythm. Pulmonary/Chest: Effort normal and breath sounds normal. No respiratory distress. Abdominal: Soft. Bowel sounds are normal. He exhibits no distension. There is no tenderness.    Musculoskeletal:   Patient pain to the slightest to palpation he has full range of motion of his elbow and shoulder however complains of pain to the joint pain of his left chest area has no abdominal pain his pelvis is stable to rock bilateral legs with full range of motion however the pain distal neurovascular intact all extremities no neck pain   Neurological: He is alert. He exhibits normal muscle tone. Coordination normal.   Skin: Skin is warm. He is not diaphoretic. Nursing note and vitals reviewed. MDM  Number of Diagnoses or Management Options  Fall, initial encounter:   Pain of left upper extremity:   Sprain of left knee, unspecified ligament, initial encounter:   Diagnosis management comments: We will obtain x-rays treat for pain believe low risk for serious orthopedic injury at this time    X-rays read by me unremarkable for any acute fracture.   Chest x-ray without pulmonary contusions pneumothorax or rib fractures patient able to range all extremities I believe he has history of chronic pain we will treat pain meds and muscle relaxers discharged home work note is return the emergency department for worsening symptoms believe low risk serious orthopedic or surgical injury         Procedures

## 2019-06-04 NOTE — LETTER
[facility] EMERGENCY DEPT 
[address] [place] [phone] 
[phone] Work/School Note Date: 6/4/2019 To Whom It May concern: 
 
Moncho Womack III was seen and treated today in the emergency room by the following provider(s): 
Attending Provider: Delfin Llanes MD.   
 
Moncho Womack III may return to work on Thursday June 6.  
 
Sincerely, 
 
 
 
 
Chelsea Matute MD

## 2019-12-16 ENCOUNTER — HOSPITAL ENCOUNTER (EMERGENCY)
Age: 49
Discharge: HOME | End: 2019-12-16
Attending: EMERGENCY MEDICINE
Payer: MEDICAID

## 2019-12-16 ENCOUNTER — APPOINTMENT (OUTPATIENT)
Dept: GENERAL RADIOLOGY | Age: 49
End: 2019-12-16
Attending: NURSE PRACTITIONER
Payer: MEDICAID

## 2019-12-16 VITALS
RESPIRATION RATE: 17 BRPM | WEIGHT: 188.2 LBS | HEART RATE: 70 BPM | OXYGEN SATURATION: 95 % | TEMPERATURE: 98.1 F | BODY MASS INDEX: 27 KG/M2 | DIASTOLIC BLOOD PRESSURE: 73 MMHG | SYSTOLIC BLOOD PRESSURE: 112 MMHG

## 2019-12-16 DIAGNOSIS — M25.522 BILATERAL ELBOW JOINT PAIN: Primary | ICD-10-CM

## 2019-12-16 DIAGNOSIS — M25.521 BILATERAL ELBOW JOINT PAIN: Primary | ICD-10-CM

## 2019-12-16 PROCEDURE — 73070 X-RAY EXAM OF ELBOW: CPT

## 2019-12-16 PROCEDURE — 99284 EMERGENCY DEPT VISIT MOD MDM: CPT

## 2019-12-16 RX ORDER — DICLOFENAC SODIUM 10 MG/G
2 GEL TOPICAL 4 TIMES DAILY
Qty: 1 EACH | Refills: 0 | Status: SHIPPED | OUTPATIENT
Start: 2019-12-16 | End: 2021-04-12

## 2019-12-16 RX ORDER — CITALOPRAM 40 MG/1
40 TABLET, FILM COATED ORAL DAILY
COMMUNITY
End: 2021-04-12

## 2019-12-16 RX ORDER — NAPROXEN 500 MG/1
500 TABLET ORAL 2 TIMES DAILY WITH MEALS
Qty: 20 TAB | Refills: 0 | Status: SHIPPED | OUTPATIENT
Start: 2019-12-16 | End: 2019-12-26

## 2019-12-16 RX ORDER — PREGABALIN 150 MG/1
CAPSULE ORAL 2 TIMES DAILY
COMMUNITY

## 2019-12-16 NOTE — ED TRIAGE NOTES
Patient having elbow pain since October when he was digging through [place].   Pt states his elbow pain gets so bad he cannot move his shoulders

## 2019-12-16 NOTE — ED PROVIDER NOTES
EMERGENCY DEPARTMENT HISTORY AND PHYSICAL EXAM    6:09 AM      Date: (Not on file)  Patient Name: Osiris Martinez III    History of Presenting Illness     No chief complaint on file. History Provided By: Patient    Additional History (Context): Osiris Martinez III is a 44 y.o. male with anxiety who presents with bilateral elbow pain for two months after doing yard work. Pt reports he was opening holes in the backyard using trenches and since then has been experiencing elbow pain that sometimes wakes him up at night. Pt denies taking any medications for this problem. Pt denies fevers, chest pain, headaches, dizziness, sensation loss of extremity, numbness, weakness. PCP: None    Current Outpatient Medications   Medication Sig Dispense Refill    alprazolam (XANAX PO) Take 3 mg by mouth.  pregabalin (LYRICA) 150 mg capsule Take  by mouth two (2) times a day.  citalopram (CELEXA) 40 mg tablet Take 40 mg by mouth daily.  diclofenac (VOLTAREN) 1 % gel Apply 2 g to affected area four (4) times daily. 1 Each 0    naproxen (NAPROSYN) 500 mg tablet Take 1 Tab by mouth two (2) times daily (with meals) for 10 days. 20 Tab 0       Past History     Past Medical History:  Past Medical History:   Diagnosis Date    Anxiety     Cervicalgia     Fibromyalgia     GERD (gastroesophageal reflux disease)     Lower back pain     and upper    Neuropathy     Panic attacks     Rectal bleeding 5-6 years ago    has had cy and EGD by Dr. Dale Hui.  CY showed per patient internal hemorrhoids and EGD showed GERD       Past Surgical History:  Past Surgical History:   Procedure Laterality Date    HX APPENDECTOMY      HX LIPOMA RESECTION      HX OTHER SURGICAL      excision of lipoma upper back       Family History:  Family History   Problem Relation Age of Onset    Diabetes Mother     Hypertension Mother     Stroke Mother     Coronary Artery Disease Mother     Diabetes Father     Heart Surgery Father Patient has had heart valve replacement       Social History:  Social History     Tobacco Use    Smoking status: Current Every Day Smoker     Packs/day: 1.00     Years: 30.00     Pack years: 30.00    Smokeless tobacco: Never Used    Tobacco comment: Pt counseled to stop smoking. Substance Use Topics    Alcohol use: No     Alcohol/week: 0.0 standard drinks     Comment: less than social    Drug use: No       Allergies: Allergies   Allergen Reactions    Pcn [Penicillins] Hives     Other reaction(s): anaphylaxis/angioedema  Throat closed up    Ibuprofen Hives, Nausea and Vomiting and Other (comments)     GI upset. Pt can tolerate ketorolac/torodal without any problems. Review of Systems       Review of Systems   Constitutional: Negative for chills and fever. Respiratory: Negative for shortness of breath. Cardiovascular: Negative for chest pain. Gastrointestinal: Negative for abdominal pain, nausea and vomiting. Musculoskeletal: Positive for arthralgias. Bilateral elbow pain   Skin: Negative for rash. Neurological: Negative for weakness. All other systems reviewed and are negative. Physical Exam     Visit Vitals  /73 (BP 1 Location: Left arm)   Pulse 70   Temp 98.1 °F (36.7 °C)   Resp 17   Wt 85.4 kg (188 lb 3.2 oz)   SpO2 95%   BMI 27.00 kg/m²         Physical Exam  Vitals signs reviewed. Constitutional:       General: He is not in acute distress. Appearance: Normal appearance. He is well-developed. He is not ill-appearing or toxic-appearing. HENT:      Head: Normocephalic and atraumatic. Eyes:      Conjunctiva/sclera: Conjunctivae normal.      Pupils: Pupils are equal, round, and reactive to light. Neck:      Musculoskeletal: Normal range of motion. Trachea: Trachea normal.   Cardiovascular:      Rate and Rhythm: Normal rate and regular rhythm. Pulmonary:      Effort: Pulmonary effort is normal.      Breath sounds: Normal breath sounds. Abdominal:      General: There is no abdominal bruit. Palpations: Abdomen is not rigid. There is no shifting dullness, fluid wave or pulsatile mass. Tenderness: Negative signs include Dominguez's sign and McBurney's sign. Musculoskeletal:      Right elbow: He exhibits normal range of motion and no swelling. Tenderness found. Medial epicondyle tenderness noted. No radial head, no lateral epicondyle and no olecranon process tenderness noted. Left elbow: He exhibits no swelling and no effusion. Tenderness found. Lateral epicondyle tenderness noted. No olecranon process tenderness noted. Right wrist: Normal.      Left wrist: Normal.      Comments: No swelling or erythema of elbows. Neurological:      Mental Status: He is alert and oriented to person, place, and time. Diagnostic Study Results     Labs -  No results found for this or any previous visit (from the past 12 hour(s)). Radiologic Studies -   XR ELBOW LT AP/LAT    (Results Pending)   XR ELBOW RT AP/LAT    (Results Pending)         Medical Decision Making   I am the first provider for this patient. I reviewed the vital signs, available nursing notes, past medical history, past surgical history, family history and social history. Vital Signs-Reviewed the patient's vital signs. Records Reviewed: Nursing Notes and Old Medical Records (Time of Review: 6:09 AM)    ED Course: Progress Notes, Reevaluation, and Consults:      Provider Notes (Medical Decision Making): On exam patient had tenderness on medial epicondyle of right elbow and lateral tenderness of epicondyle of left elbow. No swelling or erythema of elbows, do not suspect septic joint. Xray order to rule out bony abnormality. I suspect epicondylitis. Xray look normal, no fat pads or obvious fractures. Final results pending. Diagnosis     Clinical Impression:   1.  Bilateral elbow joint pain        Disposition: home     Follow-up Information     Follow up With Specialties Details Why Contact Info    Jamil Wright MD Orthopedic Surgery Schedule an appointment as soon as possible for a visit in 1 day  [address] [facility]      [facility] EMERGENCY DEPT Emergency Medicine  If symptoms worsen [address] [place] [address] [address] [address]  [phone]           Patient's Medications   Start Taking    DICLOFENAC (VOLTAREN) 1 % GEL    Apply 2 g to affected area four (4) times daily. NAPROXEN (NAPROSYN) 500 MG TABLET    Take 1 Tab by mouth two (2) times daily (with meals) for 10 days. Continue Taking    ALPRAZOLAM (XANAX PO)    Take 3 mg by mouth. CITALOPRAM (CELEXA) 40 MG TABLET    Take 40 mg by mouth daily. PREGABALIN (LYRICA) 150 MG CAPSULE    Take  by mouth two (2) times a day. These Medications have changed    No medications on file   Stop Taking    ACETAMINOPHEN (TYLENOL) 325 MG TABLET    Take 2 Tabs by mouth every four (4) hours as needed for Pain. CYCLOBENZAPRINE (FLEXERIL) 10 MG TABLET    Take 1 Tab by mouth three (3) times daily as needed for Muscle Spasm(s). Dictation disclaimer:  Please note that this dictation was completed with [employer] the computer voice recognition software. Quite often unanticipated grammatical, syntax, homophones, and other interpretive errors are inadvertently transcribed by the computer software. Please disregard these errors. Please excuse any errors that have escaped final proofreading.

## 2019-12-18 ENCOUNTER — OFFICE VISIT (OUTPATIENT)
Dept: ORTHOPEDIC SURGERY | Age: 49
End: 2019-12-18

## 2019-12-18 VITALS
BODY MASS INDEX: 27.23 KG/M2 | OXYGEN SATURATION: 95 % | DIASTOLIC BLOOD PRESSURE: 65 MMHG | TEMPERATURE: 96.7 F | HEIGHT: 70 IN | HEART RATE: 84 BPM | WEIGHT: 190.2 LBS | SYSTOLIC BLOOD PRESSURE: 106 MMHG | RESPIRATION RATE: 16 BRPM

## 2019-12-18 DIAGNOSIS — M25.522 PAIN OF BOTH ELBOWS: ICD-10-CM

## 2019-12-18 DIAGNOSIS — M77.11 LATERAL EPICONDYLITIS OF BOTH ELBOWS: Primary | ICD-10-CM

## 2019-12-18 DIAGNOSIS — M25.521 PAIN OF BOTH ELBOWS: ICD-10-CM

## 2019-12-18 DIAGNOSIS — M77.12 LATERAL EPICONDYLITIS OF BOTH ELBOWS: Primary | ICD-10-CM

## 2019-12-18 RX ORDER — BETAMETHASONE SODIUM PHOSPHATE AND BETAMETHASONE ACETATE 3; 3 MG/ML; MG/ML
6 INJECTION, SUSPENSION INTRA-ARTICULAR; INTRALESIONAL; INTRAMUSCULAR; SOFT TISSUE ONCE
Qty: 0.5 ML | Refills: 0
Start: 2019-12-18 | End: 2019-12-18

## 2019-12-18 NOTE — PROGRESS NOTES
Verbal order given by Dr. Renetta Rivers to draw up 4 mL 0.25% Sensorcaine and 0.5 mL 30 mg/5mL Betamethasone x 2.

## 2019-12-18 NOTE — PROGRESS NOTES
1. Have you been to the ER, urgent care clinic since your last visit? Hospitalized since your last visit? No    2. Have you seen or consulted any other health care providers outside of the [address] [place] [address] since your last visit? Include any pap smears or colon screening.  No

## 2019-12-18 NOTE — PROGRESS NOTES
Patient: Piero Alan                MRN: [id number]       SSN: [id number]  YOB: 1970        AGE: 44 y.o. SEX: male    PCP: None  12/18/19    Chief Complaint   Patient presents with    Elbow Pain     cy elbow pain     HISTORY:  Piero Alan is a 44 y.o. male who is seen for bilateral elbow pain. 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 He feels elbow pain that shoots up his arms. He notes lateral elbow pain with lifting and gripping. He feels pain lifting even light objects. He has had difficulty sleeping. Pain Assessment  12/18/2019   Location of Pain Elbow   Location Modifiers Left;Right   Severity of Pain 6   Quality of Pain Throbbing;Aching;Dull   Quality of Pain Comment swelling   Duration of Pain Persistent   Frequency of Pain Constant   Aggravating Factors Bending;Stretching;Straightening   Aggravating Factors Comment twist and opening the door, reaching   Limiting Behavior Yes   Relieving Factors Nothing   Result of Injury Yes   Work-Related Injury No   Type of Injury Other (Comment)   Type of Injury Comment working in the yard     Occupation, etc:  Mr. Carla Zamorano works as a  for [employer] He is currently in [facility] for Heroin addiction. He was transitioned to [employer] from [facility] where he underwent detox. He previously worked in sales for [employer]. He lives in [place] with his 40 yo son. He also has a 23 yo son, 2 adult daughters and 6 grandchildren. Mr. Carla Zamorano weighs 190 lbs and is 5'10\" tall.       No results found for: HBA1C, HGBE8, HCU1HGAZ, ZHP0SCWQ, TGC3HNII  Weight Metrics 12/18/2019 12/16/2019 6/4/2019 10/1/2017 6/28/2016 12/5/2015 12/3/2015   Weight 190 lb 3.2 oz 188 lb 3.2 oz 165 lb 193 lb 8 oz 184 lb 6.4 oz 196 lb 190 lb   BMI 27.29 kg/m2 27 kg/m2 23.68 kg/m2 27.76 kg/m2 26.46 kg/m2 28.12 kg/m2 27.26 kg/m2       Patient Active Problem List Diagnosis Code    Blood in stool K92.1    Knee pain M25.569    Knee pain M25.569    GERD (gastroesophageal reflux disease) K21.9    Panic attacks F41.0    Rectal bleeding K62.5    Cervicalgia M54.2    Lower back pain M54.5    Lipoma of back D17.1    Back pain of thoracolumbar region M54.5, M54.6    Muscle spasm of back M62.830     REVIEW OF SYSTEMS: All Below are Negative except: See HPI   Constitutional: negative for fever, chills, and weight loss. Cardiovascular: negative for chest pain, claudication, leg swelling, SOB, SORIA   Gastrointestinal: Negative for pain, N/V/C/D, Blood in stool or urine, dysuria, hematuria, incontinence, pelvic pain. Musculoskeletal: See HPI   Neurological: Negative for dizziness and weakness. Negative for headaches, Visual changes, confusion, seizures   Phychiatric/Behavioral: Negative for depression, memory loss, substance abuse. Extremities: Negative for hair changes, rash, or skin lesion changes. Hematologic: Negative for bleeding problems, bruising, pallor or swollen lymph nodes   Peripheral Vascular: No calf pain, no circulation deficits. Social History     Socioeconomic History    Marital status: LEGALLY      Spouse name: Not on file    Number of children: Not on file    Years of education: Not on file    Highest education level: Not on file   Occupational History    Not on file   Social Needs    Financial resource strain: Not on file    Food insecurity:     Worry: Not on file     Inability: Not on file    Transportation needs:     Medical: Not on file     Non-medical: Not on file   Tobacco Use    Smoking status: Current Every Day Smoker     Packs/day: 1.00     Years: 30.00     Pack years: 30.00    Smokeless tobacco: Never Used    Tobacco comment: Pt counseled to stop smoking.    Substance and Sexual Activity    Alcohol use: No     Alcohol/week: 0.0 standard drinks     Comment: less than social    Drug use: No    Sexual activity: Yes Partners: Female     Birth control/protection: None   Lifestyle    Physical activity:     Days per week: Not on file     Minutes per session: Not on file    Stress: Not on file   Relationships    Social connections:     Talks on phone: Not on file     Gets together: Not on file     Attends [employer] service: Not on file     Active member of club or organization: Not on file     Attends meetings of clubs or organizations: Not on file     Relationship status: Not on file    Intimate partner violence:     Fear of current or ex partner: Not on file     Emotionally abused: Not on file     Physically abused: Not on file     Forced sexual activity: Not on file   Other Topics Concern    Not on file   Social History Narrative    Not on file      Allergies   Allergen Reactions    Pcn [Penicillins] Hives     Other reaction(s): anaphylaxis/angioedema  Throat closed up    Gabapentin Other (comments)     Black out and hit the floor    Ibuprofen Hives, Nausea and Vomiting and Other (comments)     GI upset. Pt can tolerate ketorolac/torodal without any problems. Current Outpatient Medications   Medication Sig    alprazolam (XANAX PO) Take 3 mg by mouth.  pregabalin (LYRICA) 150 mg capsule Take  by mouth two (2) times a day.  citalopram (CELEXA) 40 mg tablet Take 40 mg by mouth daily.  naproxen (NAPROSYN) 500 mg tablet Take 1 Tab by mouth two (2) times daily (with meals) for 10 days.  diclofenac (VOLTAREN) 1 % gel Apply 2 g to affected area four (4) times daily. No current facility-administered medications for this visit.        PHYSICAL EXAMINATION:  Visit Vitals  /65 (BP 1 Location: Left arm, BP Patient Position: Sitting)   Pulse 84   Temp 96.7 °F (35.9 °C) (Oral)   Resp 16   Ht 5' 10\" (1.778 m)   Wt 190 lb 3.2 oz (86.3 kg)   SpO2 95%   BMI 27.29 kg/m²      ORTHO EXAMINATION:  Examination Right Elbow Left Elbow   Skin Intact Intact   Range of Motion 135-0 135-0   Tenderness + lateral + lateral   Swelling - -   Bruising - -   Stability Normal Normal   Motor Strength  Normal Normal   Neurovascular Intact Intact       TIME OUT:  Chart reviewed for the following:   Agustina Rodriguez MD, have reviewed the History, Physical and updated the Allergic reactions for [facility] performed immediately prior to start of procedure:  Agustina Rodriguez MD, have performed the following reviews on Darryle Garter prior to the start of the procedure:          * Patient was identified by name and date of birth   * Agreement on procedure being performed was verified  * Risks and Benefits explained to the patient  * Procedure site verified and marked as necessary  * Patient was positioned for comfort  * Consent was obtained     Time: 10:16 AM     Date of procedure: 12/18/2019  Procedure performed by:  Stephane Buckner MD  Mr. Macy Simons tolerated the procedure well with no complications. RADIOGRAPHS:  XR BILAT ELBOW 12/16/19 [facility]  IMPRESSION:  Right elbow:  No acute osseous findings. See additional details above. Left elbow:  No acute osseous findings. See additional details above.  -I have independently reviewed these images during this office visit. -Dr. Tony Sosa:  Two views - No fractures, negative fat pad sign, no joint space narrowing. IMPRESSION:      ICD-10-CM ICD-9-CM    1. Lateral epicondylitis of both elbows M77.11 726.32 betamethasone (CELESTONE SOLUSPAN) 6 mg/mL injection    M77.12  BETAMETHASONE ACETATE & SODIUM PHOSPHATE INJECTION 3 MG EA.      DRAIN/INJECT INTERMEDIATE JOINT/BURSA   2. Pain of both elbows M25.521 719.42 betamethasone (CELESTONE SOLUSPAN) 6 mg/mL injection    M25.522  BETAMETHASONE ACETATE & SODIUM PHOSPHATE INJECTION 3 MG EA.      DRAIN/INJECT INTERMEDIATE JOINT/BURSA     PLAN:  After discussing treatment options, patient's lateral epicondyles were injected with 4 cc Marcaine and 1/2 cc Celestone. There is no need for surgery at this time.   He will follow up as needed.       Scribed by Liliya Foster ([address]) as dictated by Jin Alvarez MD

## 2020-10-31 ENCOUNTER — HOSPITAL ENCOUNTER (EMERGENCY)
Age: 50
Discharge: HOME | End: 2020-10-31
Attending: EMERGENCY MEDICINE
Payer: MEDICAID

## 2020-10-31 VITALS
RESPIRATION RATE: 17 BRPM | SYSTOLIC BLOOD PRESSURE: 116 MMHG | TEMPERATURE: 98.7 F | OXYGEN SATURATION: 100 % | HEART RATE: 86 BPM | DIASTOLIC BLOOD PRESSURE: 76 MMHG

## 2020-10-31 DIAGNOSIS — R07.9 CHEST PAIN, UNSPECIFIED TYPE: Primary | ICD-10-CM

## 2020-10-31 LAB
ALBUMIN SERPL-MCNC: 3.5 G/DL (ref 3.4–5)
ALBUMIN/GLOB SERPL: 1.1 {RATIO} (ref 0.8–1.7)
ALP SERPL-CCNC: 72 U/L (ref 45–117)
ALT SERPL-CCNC: 28 U/L (ref 16–61)
ANION GAP SERPL CALC-SCNC: 7 MMOL/L (ref 3–18)
AST SERPL-CCNC: 19 U/L (ref 10–38)
ATRIAL RATE: 86 BPM
BASOPHILS # BLD: 0 K/UL (ref 0–0.1)
BASOPHILS NFR BLD: 0 % (ref 0–2)
BILIRUB SERPL-MCNC: 0.3 MG/DL (ref 0.2–1)
BUN SERPL-MCNC: 10 MG/DL (ref 7–18)
BUN/CREAT SERPL: 10 (ref 12–20)
CALCIUM SERPL-MCNC: 9.1 MG/DL (ref 8.5–10.1)
CALCULATED P AXIS, ECG09: 15 DEGREES
CALCULATED R AXIS, ECG10: 42 DEGREES
CALCULATED T AXIS, ECG11: 21 DEGREES
CHLORIDE SERPL-SCNC: 108 MMOL/L (ref 100–111)
CK MB CFR SERPL CALC: 1.3 % (ref 0–4)
CK MB SERPL-MCNC: 1.3 NG/ML (ref 5–25)
CK SERPL-CCNC: 102 U/L (ref 39–308)
CO2 SERPL-SCNC: 27 MMOL/L (ref 21–32)
CREAT SERPL-MCNC: 1.02 MG/DL (ref 0.6–1.3)
DIAGNOSIS, 93000: NORMAL
DIFFERENTIAL METHOD BLD: ABNORMAL
EOSINOPHIL # BLD: 0.2 K/UL (ref 0–0.4)
EOSINOPHIL NFR BLD: 3 % (ref 0–5)
ERYTHROCYTE [DISTWIDTH] IN BLOOD BY AUTOMATED COUNT: 13.8 % (ref 11.6–14.5)
GLOBULIN SER CALC-MCNC: 3.1 G/DL (ref 2–4)
GLUCOSE SERPL-MCNC: 137 MG/DL (ref 74–99)
HCT VFR BLD AUTO: 43.9 % (ref 36–48)
HGB BLD-MCNC: 14.8 G/DL (ref 13–16)
LYMPHOCYTES # BLD: 1.8 K/UL (ref 0.9–3.6)
LYMPHOCYTES NFR BLD: 21 % (ref 21–52)
MCH RBC QN AUTO: 24.6 PG (ref 24–34)
MCHC RBC AUTO-ENTMCNC: 33.7 G/DL (ref 31–37)
MCV RBC AUTO: 72.9 FL (ref 74–97)
MONOCYTES # BLD: 0.6 K/UL (ref 0.05–1.2)
MONOCYTES NFR BLD: 7 % (ref 3–10)
NEUTS SEG # BLD: 5.8 K/UL (ref 1.8–8)
NEUTS SEG NFR BLD: 69 % (ref 40–73)
P-R INTERVAL, ECG05: 156 MS
PLATELET # BLD AUTO: 145 K/UL (ref 135–420)
PMV BLD AUTO: 9.8 FL (ref 9.2–11.8)
POTASSIUM SERPL-SCNC: 4.1 MMOL/L (ref 3.5–5.5)
PROT SERPL-MCNC: 6.6 G/DL (ref 6.4–8.2)
Q-T INTERVAL, ECG07: 374 MS
QRS DURATION, ECG06: 84 MS
QTC CALCULATION (BEZET), ECG08: 447 MS
RBC # BLD AUTO: 6.02 M/UL (ref 4.7–5.5)
SODIUM SERPL-SCNC: 142 MMOL/L (ref 136–145)
TROPONIN I BLD-MCNC: <0.04 NG/ML (ref 0–0.08)
TROPONIN I SERPL-MCNC: <0.02 NG/ML (ref 0–0.04)
VENTRICULAR RATE, ECG03: 86 BPM
WBC # BLD AUTO: 8.4 K/UL (ref 4.6–13.2)

## 2020-10-31 PROCEDURE — 80053 COMPREHEN METABOLIC PANEL: CPT

## 2020-10-31 PROCEDURE — 74011250637 HC RX REV CODE- 250/637: Performed by: EMERGENCY MEDICINE

## 2020-10-31 PROCEDURE — 85025 COMPLETE CBC W/AUTO DIFF WBC: CPT

## 2020-10-31 PROCEDURE — 84484 ASSAY OF TROPONIN QUANT: CPT

## 2020-10-31 PROCEDURE — 99284 EMERGENCY DEPT VISIT MOD MDM: CPT

## 2020-10-31 PROCEDURE — 93005 ELECTROCARDIOGRAM TRACING: CPT

## 2020-10-31 PROCEDURE — 82550 ASSAY OF CK (CPK): CPT

## 2020-10-31 RX ORDER — ALPRAZOLAM 0.25 MG/1
1 TABLET ORAL
Status: COMPLETED | OUTPATIENT
Start: 2020-10-31 | End: 2020-10-31

## 2020-10-31 RX ADMIN — ALPRAZOLAM 1 MG: 0.25 TABLET ORAL at 10:46

## 2020-10-31 NOTE — ED PROVIDER NOTES
EMERGENCY DEPARTMENT HISTORY AND PHYSICAL EXAM    9:47 AM some crowding and staffing problems with mostly numerous critical patients in the morning. Delay in seeing this patient. Date: 10/31/2020  Patient Name: Beatriz Guzman    History of Presenting Illness     Chief Complaint   Patient presents with    Chest Pain    Shortness of Breath         History Provided By: patient    Additional History (Context): Beatriz Guzman is a 44 y.o. male presents with history of anxiety, awoke about 2 hours ago around 730 with stabbing pains in his chest and his back and his shoulder blades, difficulty breathing. Very minor sweats, no nausea, no abdominal symptoms or diarrhea. Found he could not catch his breath. He drove himself to the ER. At the time of my examination his pain is moderate located in his back around the shoulder blades. He was sleeping when I initially saw him. He does have family history of heart disease diabetes hypertension, but he himself does not have any of these. His only problem is the anxiety. .    PCP: None    Chief Complaint:   Duration:    Timing:    Location:   Quality:   Severity:   Modifying Factors:   Associated Symptoms:       Current Outpatient Medications   Medication Sig Dispense Refill    alprazolam (XANAX PO) Take 3 mg by mouth.  pregabalin (LYRICA) 150 mg capsule Take  by mouth two (2) times a day.  citalopram (CELEXA) 40 mg tablet Take 40 mg by mouth daily.  diclofenac (VOLTAREN) 1 % gel Apply 2 g to affected area four (4) times daily. 1 Each 0       Past History     Past Medical History:  Past Medical History:   Diagnosis Date    Anxiety     Cervicalgia     Fibromyalgia     GERD (gastroesophageal reflux disease)     Lower back pain     and upper    Neuropathy     Panic attacks     Rectal bleeding 5-6 years ago    has had cy and EGD by Dr. Sakina Zamarripa.  CY showed per patient internal hemorrhoids and EGD showed GERD       Past Surgical History:  Past Surgical History:   Procedure Laterality Date    HX APPENDECTOMY      HX LIPOMA RESECTION      HX OTHER SURGICAL      excision of lipoma upper back       Family History:  Family History   Problem Relation Age of Onset    Diabetes Mother     Hypertension Mother     Stroke Mother     Coronary Artery Disease Mother     Diabetes Father     Heart Surgery Father         Patient has had heart valve replacement       Social History:  Social History     Tobacco Use    Smoking status: Current Every Day Smoker     Packs/day: 1.00     Years: 30.00     Pack years: 30.00    Smokeless tobacco: Never Used    Tobacco comment: Pt counseled to stop smoking. Substance Use Topics    Alcohol use: No     Alcohol/week: 0.0 standard drinks     Comment: less than social    Drug use: No       Allergies: Allergies   Allergen Reactions    Pcn [Penicillins] Hives     Other reaction(s): anaphylaxis/angioedema  Throat closed up    Gabapentin Other (comments)     Black out and hit the floor    Ibuprofen Hives, Nausea and Vomiting and Other (comments)     GI upset. Pt can tolerate ketorolac/torodal without any problems. Review of Systems     Review of Systems   Constitutional: Negative for diaphoresis and fever. HENT: Negative for congestion and sore throat. Eyes: Negative for pain and itching. Respiratory: Positive for shortness of breath. Negative for cough. Cardiovascular: Positive for chest pain. Negative for palpitations. Gastrointestinal: Negative for abdominal pain and diarrhea. Endocrine: Negative for polydipsia and polyuria. Genitourinary: Negative for dysuria and hematuria. Musculoskeletal: Negative for arthralgias and myalgias. Skin: Negative for rash and wound. Neurological: Negative for seizures and syncope. Hematological: Does not bruise/bleed easily. Psychiatric/Behavioral: Negative for agitation and hallucinations.          Physical Exam       Patient Vitals for the past 12 hrs:   Temp Pulse Resp BP SpO2   10/31/20 0734 98.7 °F (37.1 °C) 86 17 116/76 100 %       Physical Exam  Vitals signs and nursing note reviewed. Constitutional:       General: He is not in acute distress. Appearance: He is well-developed. Comments: Sleeping, awakes easily   HENT:      Head: Normocephalic and atraumatic. Eyes:      General: No scleral icterus. Conjunctiva/sclera: Conjunctivae normal.   Neck:      Musculoskeletal: Normal range of motion and neck supple. Vascular: No JVD. Cardiovascular:      Rate and Rhythm: Normal rate and regular rhythm. Heart sounds: Normal heart sounds. Comments: 4 intact extremity pulses  Pulmonary:      Effort: Pulmonary effort is normal.      Breath sounds: Normal breath sounds. Chest:      Chest wall: No tenderness. Abdominal:      Palpations: Abdomen is soft. There is no mass. Tenderness: There is no abdominal tenderness. Musculoskeletal: Normal range of motion. Lymphadenopathy:      Cervical: No cervical adenopathy. Skin:     General: Skin is warm and dry. Neurological:      Mental Status: He is alert. Diagnostic Study Results   Labs -  Recent Results (from the past 12 hour(s))   CARDIAC PANEL,(CK, CKMB & TROPONIN)    Collection Time: 10/31/20  7:33 AM   Result Value Ref Range    CK - MB 1.3 <3.6 ng/ml    CK-MB Index 1.3 0.0 - 4.0 %     39 - 308 U/L    Troponin-I, QT <0.02 0.0 - 0.045 NG/ML   CBC WITH AUTOMATED DIFF    Collection Time: 10/31/20  7:33 AM   Result Value Ref Range    WBC 8.4 4.6 - 13.2 K/uL    RBC 6.02 (H) 4.70 - 5.50 M/uL    HGB 14.8 13.0 - 16.0 g/dL    HCT 43.9 36.0 - 48.0 %    MCV 72.9 (L) 74.0 - 97.0 FL    MCH 24.6 24.0 - 34.0 PG    MCHC 33.7 31.0 - 37.0 g/dL    RDW 13.8 11.6 - 14.5 %    PLATELET 795 081 - 047 K/uL    MPV 9.8 9.2 - 11.8 FL    NEUTROPHILS 69 40 - 73 %    LYMPHOCYTES 21 21 - 52 %    MONOCYTES 7 3 - 10 %    EOSINOPHILS 3 0 - 5 %    BASOPHILS 0 0 - 2 %    ABS.  NEUTROPHILS 5.8 1.8 - 8.0 K/UL ABS. LYMPHOCYTES 1.8 0.9 - 3.6 K/UL    ABS. MONOCYTES 0.6 0.05 - 1.2 K/UL    ABS. EOSINOPHILS 0.2 0.0 - 0.4 K/UL    ABS. BASOPHILS 0.0 0.0 - 0.1 K/UL    DF AUTOMATED     METABOLIC PANEL, COMPREHENSIVE    Collection Time: 10/31/20  7:33 AM   Result Value Ref Range    Sodium 142 136 - 145 mmol/L    Potassium 4.1 3.5 - 5.5 mmol/L    Chloride 108 100 - 111 mmol/L    CO2 27 21 - 32 mmol/L    Anion gap 7 3.0 - 18 mmol/L    Glucose 137 (H) 74 - 99 mg/dL    BUN 10 7.0 - 18 MG/DL    Creatinine 1.02 0.6 - 1.3 MG/DL    BUN/Creatinine ratio 10 (L) 12 - 20      GFR est AA >60 >60 ml/min/1.73m2    GFR est non-AA >60 >60 ml/min/1.73m2    Calcium 9.1 8.5 - 10.1 MG/DL    Bilirubin, total 0.3 0.2 - 1.0 MG/DL    ALT (SGPT) 28 16 - 61 U/L    AST (SGOT) 19 10 - 38 U/L    Alk. phosphatase 72 45 - 117 U/L    Protein, total 6.6 6.4 - 8.2 g/dL    Albumin 3.5 3.4 - 5.0 g/dL    Globulin 3.1 2.0 - 4.0 g/dL    A-G Ratio 1.1 0.8 - 1.7     EKG, 12 LEAD, INITIAL    Collection Time: 10/31/20  7:35 AM   Result Value Ref Range    Ventricular Rate 86 BPM    Atrial Rate 86 BPM    P-R Interval 156 ms    QRS Duration 84 ms    Q-T Interval 374 ms    QTC Calculation (Bezet) 447 ms    Calculated P Axis 15 degrees    Calculated R Axis 42 degrees    Calculated T Axis 21 degrees    Diagnosis       Normal sinus rhythm  Normal ECG  When compared with ECG of 20-DEC-2010 12:25,  Left posterior fascicular block is no longer present  Confirmed by Roslyn Coburn MD, Fernie Gonzalez ([id number]) on 10/31/2020 9:41:52 AM     POC TROPONIN-I    Collection Time: 10/31/20 10:49 AM   Result Value Ref Range    Troponin-I (POC) <0.04 0.00 - 0.08 ng/mL       Radiologic Studies -   No orders to display     No results found. Medications ordered:   Medications   ALPRAZolam (XANAX) tablet 1 mg (1 mg Oral Given 10/31/20 1046)         Medical Decision Making   Initial Medical Decision Making and DDx:  Initial EKG sinus rhythm at 86 no acute ischemic process.     Initial labs troponin is normal do not suspect MI at this point. Is reassuring he was able to go to sleep. He states he has had negative stress test in the past.      Do not suspect pneumonia or coronavirus. A lot of aspect sound like anxiety, will try a Xanax. His pressure is 118 with no history of persistent high blood pressure I do not suspect aortic disease. Do not particularly suspect PE.    ED Course: Progress Notes, Reevaluation, and Consults:  ED Course as of Oct 31 1214   Sat Oct 31, 2020   1045 Nurse at bedside to give him Xanax. Time for a second Trope.    [CB]      ED Course User Index  [CB] Arun Friend MD     12:14 PM Pt reevaluated at this time. Discussed results and findings, as well as, diagnosis and plan for discharge. Follow up with doctors/services listed. Return to the emergency department for alarming symptoms. Pt verbalizes understanding and agreement with plan. All questions addressed. Symptoms have completely resolved. 2 - troponins. Do not suspect aortic disease PE pneumonia pneumothorax ACS. He will be discharged. Recommend follow-up with primary care and cardiology. Referred to Dr. Chinmay Villanueva. I am the first provider for this patient. I reviewed the vital signs, available nursing notes, past medical history, past surgical history, family history and social history. Patient Vitals for the past 12 hrs:   Temp Pulse Resp BP SpO2   10/31/20 0734 98.7 °F (37.1 °C) 86 17 116/76 100 %       Vital Signs-Reviewed the patient's vital signs. Pulse Oximetry Analysis, Cardiac Monitor, 12 lead ekg: No hypoxia on room air  Interpreted by the EP. Records Reviewed: Nursing notes reviewed (Time of Review: 9:47 AM)    Procedures:   Critical Care Time:   Aspirin: (was aspirin given for stroke?)    Diagnosis     Clinical Impression:   1.  Chest pain, unspecified type        Disposition: Discharged      Follow-up Information     Follow up With Specialties Details Why Contact Info    Ama Barrios MD Cardiology, Internal Medicine Call in 2 days  [facility] [address]  [phone]             Patient's Medications   Start Taking    No medications on file   Continue Taking    ALPRAZOLAM (XANAX PO)    Take 3 mg by mouth. CITALOPRAM (CELEXA) 40 MG TABLET    Take 40 mg by mouth daily. DICLOFENAC (VOLTAREN) 1 % GEL    Apply 2 g to affected area four (4) times daily. PREGABALIN (LYRICA) 150 MG CAPSULE    Take  by mouth two (2) times a day.    These Medications have changed    No medications on file   Stop Taking    No medications on file     _______________________________    Notes:    Brandon Lazcano MD using Dragon dictation      _______________________________

## 2020-10-31 NOTE — DISCHARGE INSTRUCTIONS

## 2020-10-31 NOTE — ED TRIAGE NOTES
Chest pain with radiation between shoulder blades. Started aprox. 2 hours prior to arrival. Having shortness of breath since start of pain.

## 2020-10-31 NOTE — ED NOTES
Pt presents breathing fast, has history of panic attacks, advised pt to slow breathing, pt now seems better

## 2021-02-23 ENCOUNTER — HOSPITAL ENCOUNTER (EMERGENCY)
Age: 51
Discharge: HOME | End: 2021-02-23
Attending: EMERGENCY MEDICINE
Payer: MEDICAID

## 2021-02-23 ENCOUNTER — APPOINTMENT (OUTPATIENT)
Dept: GENERAL RADIOLOGY | Age: 51
End: 2021-02-23
Attending: EMERGENCY MEDICINE
Payer: MEDICAID

## 2021-02-23 VITALS
DIASTOLIC BLOOD PRESSURE: 65 MMHG | OXYGEN SATURATION: 97 % | SYSTOLIC BLOOD PRESSURE: 111 MMHG | RESPIRATION RATE: 18 BRPM | TEMPERATURE: 97.9 F | HEART RATE: 99 BPM

## 2021-02-23 DIAGNOSIS — F17.210 CIGARETTE SMOKER: ICD-10-CM

## 2021-02-23 DIAGNOSIS — R05.8 PRODUCTIVE COUGH: ICD-10-CM

## 2021-02-23 DIAGNOSIS — R07.9 CHEST PAIN, UNSPECIFIED TYPE: Primary | ICD-10-CM

## 2021-02-23 LAB
ALBUMIN SERPL-MCNC: 3 G/DL (ref 3.4–5)
ALBUMIN/GLOB SERPL: 0.8 {RATIO} (ref 0.8–1.7)
ALP SERPL-CCNC: 88 U/L (ref 45–117)
ALT SERPL-CCNC: 22 U/L (ref 16–61)
ANION GAP SERPL CALC-SCNC: 3 MMOL/L (ref 3–18)
AST SERPL-CCNC: 7 U/L (ref 10–38)
ATRIAL RATE: 85 BPM
BASOPHILS # BLD: 0 K/UL (ref 0–0.1)
BASOPHILS NFR BLD: 0 % (ref 0–2)
BILIRUB SERPL-MCNC: 0.7 MG/DL (ref 0.2–1)
BUN SERPL-MCNC: 16 MG/DL (ref 7–18)
BUN/CREAT SERPL: 16 (ref 12–20)
CALCIUM SERPL-MCNC: 8.5 MG/DL (ref 8.5–10.1)
CALCULATED P AXIS, ECG09: 18 DEGREES
CALCULATED R AXIS, ECG10: 53 DEGREES
CALCULATED T AXIS, ECG11: 19 DEGREES
CHLORIDE SERPL-SCNC: 107 MMOL/L (ref 100–111)
CK MB CFR SERPL CALC: ABNORMAL % (ref 0–4)
CK MB SERPL-MCNC: <1 NG/ML (ref 5–25)
CK SERPL-CCNC: 27 U/L (ref 39–308)
CO2 SERPL-SCNC: 27 MMOL/L (ref 21–32)
COVID-19 RAPID TEST, COVR: NOT DETECTED
CREAT SERPL-MCNC: 0.97 MG/DL (ref 0.6–1.3)
DIAGNOSIS, 93000: NORMAL
DIFFERENTIAL METHOD BLD: ABNORMAL
EOSINOPHIL # BLD: 0.3 K/UL (ref 0–0.4)
EOSINOPHIL NFR BLD: 4 % (ref 0–5)
ERYTHROCYTE [DISTWIDTH] IN BLOOD BY AUTOMATED COUNT: 15.2 % (ref 11.6–14.5)
GLOBULIN SER CALC-MCNC: 3.9 G/DL (ref 2–4)
GLUCOSE SERPL-MCNC: 130 MG/DL (ref 74–99)
HCT VFR BLD AUTO: 41 % (ref 36–48)
HGB BLD-MCNC: 13.5 G/DL (ref 13–16)
LYMPHOCYTES # BLD: 0.6 K/UL (ref 0.9–3.6)
LYMPHOCYTES NFR BLD: 7 % (ref 21–52)
MAGNESIUM SERPL-MCNC: 2.2 MG/DL (ref 1.6–2.6)
MCH RBC QN AUTO: 24.1 PG (ref 24–34)
MCHC RBC AUTO-ENTMCNC: 32.9 G/DL (ref 31–37)
MCV RBC AUTO: 73.2 FL (ref 74–97)
MONOCYTES # BLD: 0.4 K/UL (ref 0.05–1.2)
MONOCYTES NFR BLD: 5 % (ref 3–10)
NEUTS SEG # BLD: 7.5 K/UL (ref 1.8–8)
NEUTS SEG NFR BLD: 84 % (ref 40–73)
P-R INTERVAL, ECG05: 140 MS
PLATELET # BLD AUTO: 179 K/UL (ref 135–420)
PMV BLD AUTO: 9.9 FL (ref 9.2–11.8)
POTASSIUM SERPL-SCNC: 3.7 MMOL/L (ref 3.5–5.5)
PROT SERPL-MCNC: 6.9 G/DL (ref 6.4–8.2)
Q-T INTERVAL, ECG07: 356 MS
QRS DURATION, ECG06: 84 MS
QTC CALCULATION (BEZET), ECG08: 423 MS
RBC # BLD AUTO: 5.6 M/UL (ref 4.7–5.5)
SARS-COV-2, COV2: NORMAL
SODIUM SERPL-SCNC: 137 MMOL/L (ref 136–145)
SOURCE, COVRS: NORMAL
TROPONIN I SERPL-MCNC: <0.02 NG/ML (ref 0–0.04)
VENTRICULAR RATE, ECG03: 85 BPM
WBC # BLD AUTO: 8.8 K/UL (ref 4.6–13.2)

## 2021-02-23 PROCEDURE — 93005 ELECTROCARDIOGRAM TRACING: CPT

## 2021-02-23 PROCEDURE — 99285 EMERGENCY DEPT VISIT HI MDM: CPT

## 2021-02-23 PROCEDURE — 83735 ASSAY OF MAGNESIUM: CPT

## 2021-02-23 PROCEDURE — 71045 X-RAY EXAM CHEST 1 VIEW: CPT

## 2021-02-23 PROCEDURE — 80053 COMPREHEN METABOLIC PANEL: CPT

## 2021-02-23 PROCEDURE — 85025 COMPLETE CBC W/AUTO DIFF WBC: CPT

## 2021-02-23 PROCEDURE — 74011250636 HC RX REV CODE- 250/636: Performed by: EMERGENCY MEDICINE

## 2021-02-23 PROCEDURE — 87635 SARS-COV-2 COVID-19 AMP PRB: CPT

## 2021-02-23 PROCEDURE — 82553 CREATINE MB FRACTION: CPT

## 2021-02-23 PROCEDURE — 96374 THER/PROPH/DIAG INJ IV PUSH: CPT

## 2021-02-23 RX ORDER — MORPHINE SULFATE 4 MG/ML
4 INJECTION, SOLUTION INTRAMUSCULAR; INTRAVENOUS
Status: COMPLETED | OUTPATIENT
Start: 2021-02-23 | End: 2021-02-23

## 2021-02-23 RX ORDER — CYCLOBENZAPRINE HCL 10 MG
10 TABLET ORAL
Qty: 12 TAB | Refills: 0 | Status: SHIPPED | OUTPATIENT
Start: 2021-02-23 | End: 2021-04-12

## 2021-02-23 RX ADMIN — MORPHINE SULFATE 4 MG: 4 INJECTION, SOLUTION INTRAMUSCULAR; INTRAVENOUS at 07:00

## 2021-02-23 NOTE — ED NOTES
The patient's been seen and and evaluated and disposition by Dr. Sandor Mitchell from the previous team.  The the patient was ready for discharge and found to want increased pain control and was given Tylenol and Motrin for home use. We will give the patient a muscle relaxant to add to over-the-counter medications and have him follow up for his previous disposition.

## 2021-02-23 NOTE — ED NOTES
Patient is set for discharge. Informed patient that he is ready for discharge. Patient is still complaining of chest pain and would like something else for the pain.  Will inform MD.

## 2021-02-23 NOTE — ED NOTES
Patient in bed complaining of upper abdominal, lower back and chest pain. Patient is restless and moving around in bed.  Informed MD.

## 2021-02-23 NOTE — DISCHARGE INSTRUCTIONS
SPECIFIC PATIENT INSTRUCTIONS FROM THE EMERGENCY PHYSICIAN WHO TREATED YOU TODAY:  1. Return if worse. 2. Follow up with your primary doctor or your cardiologist (if you have one) in the next 2-4 days for reevaluation. 3.  Over-the-counter Tylenol Motrin for your aches and pains. 4.  Today you were tested in the ER for coronavirus. The results may take a few days to come back. Until then you should socially isolate yourself from others per the guidelines given to you on these discharge instructions. Social isolation means either keeping isolate until you have a negative coronavirus test or 14 days past, which ever shorter. 5.  Smoking is an addictive habit. It may be difficult to quit smoking on your own. Seek the help of your primary doctor for assistance and guidance in quitting smoking. IMPORTANT FACTS ABOUT SMOKING you need to know as a smoker (and hopefully one that will quit smoking):  1. More than 480,000 Americans die each year of smoking. 2. On average, smoking will cut 13 years from your life expectancy. 3. Lung cancer is not the only malignancy you can get from smoking. Others include cancer of the bladder, blood, bone marrow, cervix, colon, esophagus, kidneys, larynx, liver, mouth, pancreas, rectum, stomach, and throat. 4. In addition to cancer, smoking can increase your risk of coronary heart disease and stroke by anywhere from 200 percent to 400 percent. 5. Smoking is a problem that hits poorer people hardest. In fact, 80 percent of the world's smokers live in low- to medium-income countries. Even in the [place] 24.3 percent of people living below the poverty line are smokers compared to 14.3 percent of those living above the poverty line. 6. According to a study published in the Archives of Toxicology, there is enough nicotine in five cigarettes to kill an average adult if ingested whole.  With that being said, most smokers take in an average of one to two milligrams per cigarette of which 0.03 milligrams is absorbed into the bloodstream.  7. There are more than 4,000 chemicals in tobacco smoke, of which more than 250 are known to be harmful, more than 50 are known to cause cancer, and 11 are classified as Group I carcinogens. 8. Benzene is a major cause of acute myeloid leukemia. Not surprisingly, cigarette smoke is the major source of benzene. Among smokers in the [place], 90 percent of their benzene exposure will come from cigarettes. 9. Radioactive lead, polonium, and hydrogen cyanide can all be found in cigarette smoke. History buffs will recognize hydrogen cyanide as a compound used back in World War II as a genocidal agent. 10. Of the six million smoking-related death reported around the world each year, 890,000 (or roughly 15 percent) are the result of secondhand smoke. Despite what some may tell you, there is no safe level of exposure to secondhand smoke. 11. Tobacco kills more than six million people each year, translating to one smoking-related death every five seconds. That is a million more deaths than occurs each year as a result of HIV, tuberculosis, and malaria combined. 12. According to the Centers for Disease Control and Prevention, there were 37.8 million smokers in the [place] in 2016. Over 16 million Americans are currently living with a tobacco-related disease, including chronic obstructive pulmonary disorder (COPD). 13. Worldwide, around 10 million cigarettes are purchased per minute, 15 billion are sold per day, and upwards of five trillion are produced and used every year. 14. A typical cigarette can contain anywhere from eight to nine milligrams of nicotine. By contrast, the nicotine content in a cigar can run anywhere from 100 milligrams to 400 milligrams. 15. Tobacco costs the [place] economy more than $300 billion dollars each year.  Of this, $170 billion goes toward medical care, while more than $156 billion is attributed to lost productivity due to illness and death. 12. While fewer young adults are smoking cigarettes in the [place] today, over 3,200 teens and adolescents try their first cigarette every day. It's estimated that 2,100 of these will go on to become daily smokers. 16. Statistics suggest that 5.6 million children living today in the [place] will die of a smoking-related disease. That is equal to one of every 13 children. 18. The WHO has concluded that half of all smokers will die as a result of tobacco use.

## 2021-02-23 NOTE — ED NOTES
Provided patient with discharge paperwork. Advised patient of prescription that was called into the pharmacy for him. Encouraged patient that if pains get worse or does not subside after taking medication to please come back to the ED. Patient verbally acknowledged understanding of discharge instructions. Patient had no further questions or concerns.

## 2021-02-23 NOTE — ED TRIAGE NOTES
Patient arrived by EMS complaining of chest pain for the past two days and a cough since yesterday. Patient denies any falls. Nothing helps the pain.

## 2021-02-23 NOTE — ED PROVIDER NOTES
Cecilio Rivera  [facility] EMERGENCY DEPT      6:53 AM    Date: 2/23/2021  Patient Name: Dalton Tom    History of Presenting Illness     Chief Complaint   Patient presents with   • Chest Pain   • Cough       50 y.o. male with noted past medical history who presents to the emergency department with chest pain and cough.    Patient states he was in his usual state of health until 2 days ago when started having a cough that is productive of green sputum to the present.  He states that with the vigorous coughing over the last days gotten some increased chest pain and now has some subcostal chest to abdominal discomfort as well that only with coughing.  Chest pain has been an achy pain at persistent for the last 24 hours but worse with coughing.  He denies any caty shortness of breath.    No fever or chills.    The patient denies recent travel outside [place] and denies recent travel to areas large social gatherings.  The patient denies any known history of Covid 19 exposure.    Patient denies any other associated signs or symptoms.  Patient denies any other complaints.    Nursing notes regarding the HPI and triage nursing notes were reviewed.     Prior medical records were reviewed.     Current Facility-Administered Medications   Medication Dose Route Frequency Provider Last Rate Last Admin   • morphine injection 4 mg  4 mg IntraVENous NOW Israel Johnson MD         Current Outpatient Medications   Medication Sig Dispense Refill   • alprazolam (XANAX PO) Take 3 mg by mouth.     • pregabalin (LYRICA) 150 mg capsule Take  by mouth two (2) times a day.     • citalopram (CELEXA) 40 mg tablet Take 40 mg by mouth daily.     • diclofenac (VOLTAREN) 1 % gel Apply 2 g to affected area four (4) times daily. 1 Each 0       Past History     Past Medical History:  Past Medical History:   Diagnosis Date   • Anxiety    • Cervicalgia    • Fibromyalgia    • GERD (gastroesophageal reflux disease)    • Lower back pain     and upper   Neuropathy     Panic attacks     Rectal bleeding 5-6 years ago    has had cy and EGD by Dr. Kasandra Torres. CY showed per patient internal hemorrhoids and EGD showed GERD       Past Surgical History:  Past Surgical History:   Procedure Laterality Date    HX APPENDECTOMY      HX LIPOMA RESECTION      HX OTHER SURGICAL      excision of lipoma upper back       Family History:  Family History   Problem Relation Age of Onset    Diabetes Mother     Hypertension Mother     Stroke Mother     Coronary Artery Disease Mother     Diabetes Father     Heart Surgery Father         Patient has had heart valve replacement       Social History:  Social History     Tobacco Use    Smoking status: Current Every Day Smoker     Packs/day: 1.00     Years: 30.00     Pack years: 30.00    Smokeless tobacco: Never Used    Tobacco comment: Pt counseled to stop smoking. Substance Use Topics    Alcohol use: No     Alcohol/week: 0.0 standard drinks     Comment: less than social    Drug use: No       Allergies: Allergies   Allergen Reactions    Pcn [Penicillins] Hives     Other reaction(s): anaphylaxis/angioedema  Throat closed up    Gabapentin Other (comments)     Black out and hit the floor    Ibuprofen Hives, Nausea and Vomiting and Other (comments)     GI upset. Pt can tolerate ketorolac/torodal without any problems. Patient's primary care provider (as noted in EPIC):  None    Review of Systems    Visit Vitals  /65   Pulse 99   Temp 97.9 °F (36.6 °C)   Resp 18   SpO2 97%       PHYSICAL EXAM:    CONSTITUTIONAL:  Alert, in no apparent distress;  well developed;  well nourished. HEAD:  Normocephalic, atraumatic. EYES:  EOMI. Non-icteric sclera. Normal conjunctiva. ENTM:  Nose:  no rhinorrhea. Throat:  no erythema or exudate, mucous membranes moist.  NECK:  No JVD. Supple  RESPIRATORY:  Chest clear, equal breath sounds, good air movement. CARDIOVASCULAR:  Regular rate and rhythm.   No murmurs, rubs, or gallops. Chest:  No rash, lesions, bruising. Bilateral mid parasternal mild reproducible tenderness to palpation. GI:  Normal bowel sounds, abdomen soft and non-tender. No rebound or guarding. BACK:  Non-tender. UPPER EXT:  Normal inspection. LOWER EXT:  No edema, no calf tenderness. Distal pulses intact. NEURO:  Moves all four extremities, and grossly normal motor exam.  SKIN:  No rashes;  Normal for age. PSYCH:  Alert and normal affect. DIFFERENTIAL DIAGNOSES/ MEDICAL DECISION MAKING:  Chest pain etiologies include acute cardiac events to include possible acute myocardial infarction, acute coronary syndrome, pneumonia, chest wall pain (myofascial/ musculoskeletal etiology), chronic obstructive pulmonary disease (copd), acute asthma exacerbation, congestive heart failure, acute bronchitis, pulmonary embolism, upper respiratory infection, referred abdominal pain, other etiologies, versus combination of the above. Diagnostic Study Results     Abnormal lab results from this emergency department encounter:  Labs Reviewed   CBC WITH AUTOMATED DIFF - Abnormal; Notable for the following components:       Result Value    RBC 5.60 (*)     MCV 73.2 (*)     RDW 15.2 (*)     NEUTROPHILS 84 (*)     LYMPHOCYTES 7 (*)     ABS.  LYMPHOCYTES 0.6 (*)     All other components within normal limits   CARDIAC PANEL,(CK, CKMB & TROPONIN) - Abnormal; Notable for the following components:    CK 27 (*)     All other components within normal limits   METABOLIC PANEL, COMPREHENSIVE - Abnormal; Notable for the following components:    Glucose 130 (*)     AST (SGOT) 7 (*)     Albumin 3.0 (*)     All other components within normal limits   MAGNESIUM   SARS-COV-2       Lab values for this patient within approximately the last 12 hours:  Recent Results (from the past 12 hour(s))   EKG, 12 LEAD, INITIAL    Collection Time: 02/23/21  5:58 AM   Result Value Ref Range    Ventricular Rate 85 BPM    Atrial Rate 85 BPM    P-R Interval 140 ms    QRS Duration 84 ms    Q-T Interval 356 ms    QTC Calculation (Bezet) 423 ms    Calculated P Axis 18 degrees    Calculated R Axis 53 degrees    Calculated T Axis 19 degrees    Diagnosis       Normal sinus rhythm  Normal ECG  When compared with ECG of 31-OCT-2020 07:35,  No significant change was found     CBC WITH AUTOMATED DIFF    Collection Time: 02/23/21  6:10 AM   Result Value Ref Range    WBC 8.8 4.6 - 13.2 K/uL    RBC 5.60 (H) 4.70 - 5.50 M/uL    HGB 13.5 13.0 - 16.0 g/dL    HCT 41.0 36.0 - 48.0 %    MCV 73.2 (L) 74.0 - 97.0 FL    MCH 24.1 24.0 - 34.0 PG    MCHC 32.9 31.0 - 37.0 g/dL    RDW 15.2 (H) 11.6 - 14.5 %    PLATELET 981 616 - 174 K/uL    MPV 9.9 9.2 - 11.8 FL    NEUTROPHILS 84 (H) 40 - 73 %    LYMPHOCYTES 7 (L) 21 - 52 %    MONOCYTES 5 3 - 10 %    EOSINOPHILS 4 0 - 5 %    BASOPHILS 0 0 - 2 %    ABS. NEUTROPHILS 7.5 1.8 - 8.0 K/UL    ABS. LYMPHOCYTES 0.6 (L) 0.9 - 3.6 K/UL    ABS. MONOCYTES 0.4 0.05 - 1.2 K/UL    ABS. EOSINOPHILS 0.3 0.0 - 0.4 K/UL    ABS. BASOPHILS 0.0 0.0 - 0.1 K/UL    DF AUTOMATED     CARDIAC PANEL,(CK, CKMB & TROPONIN)    Collection Time: 02/23/21  6:10 AM   Result Value Ref Range    CK - MB <1.0 <3.6 ng/ml    CK-MB Index  0.0 - 4.0 %     CALCULATION NOT PERFORMED WHEN RESULT IS BELOW LINEAR LIMIT    CK 27 (L) 39 - 308 U/L    Troponin-I, QT <0.02 0.0 - 9.275 NG/ML   METABOLIC PANEL, COMPREHENSIVE    Collection Time: 02/23/21  6:10 AM   Result Value Ref Range    Sodium 137 136 - 145 mmol/L    Potassium 3.7 3.5 - 5.5 mmol/L    Chloride 107 100 - 111 mmol/L    CO2 27 21 - 32 mmol/L    Anion gap 3 3.0 - 18 mmol/L    Glucose 130 (H) 74 - 99 mg/dL    BUN 16 7.0 - 18 MG/DL    Creatinine 0.97 0.6 - 1.3 MG/DL    BUN/Creatinine ratio 16 12 - 20      GFR est AA >60 >60 ml/min/1.73m2    GFR est non-AA >60 >60 ml/min/1.73m2    Calcium 8.5 8.5 - 10.1 MG/DL    Bilirubin, total 0.7 0.2 - 1.0 MG/DL    ALT (SGPT) 22 16 - 61 U/L    AST (SGOT) 7 (L) 10 - 38 U/L    Alk.  phosphatase 88 45 - 117 U/L    Protein, total 6.9 6.4 - 8.2 g/dL    Albumin 3.0 (L) 3.4 - 5.0 g/dL    Globulin 3.9 2.0 - 4.0 g/dL    A-G Ratio 0.8 0.8 - 1.7     MAGNESIUM    Collection Time: 21  6:10 AM   Result Value Ref Range    Magnesium 2.2 1.6 - 2.6 mg/dL       Radiologist and cardiologist interpretations if available at time of this note:  No results found. Emergency physician interpretation of EKG: Normal sinus rhythm about 85 bpm.    Portable chest x-ray is interpreted by the emergency physician: Increased interstitial markings concerning for possible Covid in the patient's presentation. Medication(s) ordered for patient during this emergency visit encounter:  Medications   morphine injection 4 mg (has no administration in time range)       Medical Decision Making     I am the first provider for this patient. I reviewed the vital signs, available nursing notes, past medical history, past surgical history, family history and social history. Vital Signs:  Reviewed the patient's vital signs. ED COURSE:          Patient's HAART SCORE:    History:  0  EC  Age:  1  Risk factors:  1  Troponin:  0    Patient's Total HAART score:  2    ED COURSE:  One set of cardiac enzymes was normal.      IMPRESSION AND MEDICAL DECISION MAKING:  Based upon the patients presentation with noted HPI and PE, along with the work up done in the emergency department, I believe that the patient is having non-cardiac chest pain as noted. Given the time frame of the patients chest pain, an acute cardiac event could be ruled out with one set of normal cardiac enzymes. DIAGNOSIS:  1. Chest pain    SPECIFIC PATIENT INSTRUCTIONS FROM THE EMERGENCY PHYSICIAN WHO TREATED YOU TODAY:  1. Return if worse. 2. Follow up with your primary doctor or your cardiologist (if you have one) in the next 2-4 days for reevaluation. 3.  Over-the-counter Tylenol Motrin for your aches and pains.   4.  Today you were tested in the ER for coronavirus. The results may take a few days to come back. Until then you should socially isolate yourself from others per the guidelines given to you on these discharge instructions. Social isolation means either keeping isolate until you have a negative coronavirus test or 14 days past, which ever shorter. 5.  Smoking is an addictive habit. It may be difficult to quit smoking on your own. Seek the help of your primary doctor for assistance and guidance in quitting smoking. IMPORTANT FACTS ABOUT SMOKING you need to know as a smoker (and hopefully one that will quit smoking):  1. More than 480,000 Americans die each year of smoking. 2. On average, smoking will cut 13 years from your life expectancy. 3. Lung cancer is not the only malignancy you can get from smoking. Others include cancer of the bladder, blood, bone marrow, cervix, colon, esophagus, kidneys, larynx, liver, mouth, pancreas, rectum, stomach, and throat. 4. In addition to cancer, smoking can increase your risk of coronary heart disease and stroke by anywhere from 200 percent to 400 percent. 5. Smoking is a problem that hits poorer people hardest. In fact, 80 percent of the world's smokers live in low- to medium-income countries. Even in the [place] 24.3 percent of people living below the poverty line are smokers compared to 14.3 percent of those living above the poverty line. 6. According to a study published in the Archives of Toxicology, there is enough nicotine in five cigarettes to kill an average adult if ingested whole. With that being said, most smokers take in an average of one to two milligrams per cigarette of which 0.03 milligrams is absorbed into the bloodstream.  7. There are more than 4,000 chemicals in tobacco smoke, of which more than 250 are known to be harmful, more than 50 are known to cause cancer, and 11 are classified as Group I carcinogens. 8. Benzene is a major cause of acute myeloid leukemia.  Not surprisingly, cigarette smoke is the major source of benzene. Among smokers in the [place], 90 percent of their benzene exposure will come from cigarettes. 9. Radioactive lead, polonium, and hydrogen cyanide can all be found in cigarette smoke. History buffs will recognize hydrogen cyanide as a compound used back in World War II as a genocidal agent. 10. Of the six million smoking-related death reported around the world each year, 890,000 (or roughly 15 percent) are the result of secondhand smoke. Despite what some may tell you, there is no safe level of exposure to secondhand smoke. 11. Tobacco kills more than six million people each year, translating to one smoking-related death every five seconds. That is a million more deaths than occurs each year as a result of HIV, tuberculosis, and malaria combined. 12. According to the Centers for Disease Control and Prevention, there were 37.8 million smokers in the [place] in 2016. Over 16 million Americans are currently living with a tobacco-related disease, including chronic obstructive pulmonary disorder (COPD). 13. Worldwide, around 10 million cigarettes are purchased per minute, 15 billion are sold per day, and upwards of five trillion are produced and used every year. 14. A typical cigarette can contain anywhere from eight to nine milligrams of nicotine. By contrast, the nicotine content in a cigar can run anywhere from 100 milligrams to 400 milligrams. 15. Tobacco costs the [place] economy more than $300 billion dollars each year. Of this, $170 billion goes toward medical care, while more than $156 billion is attributed to lost productivity due to illness and death. 12. While fewer young adults are smoking cigarettes in the [place] today, over 3,200 teens and adolescents try their first cigarette every day. It's estimated that 2,100 of these will go on to become daily smokers.   17. Statistics suggest that 5.6 million children living today in the [place] will die of a smoking-related disease. That is equal to one of every 13 children. 18. The WHO has concluded that half of all smokers will die as a result of tobacco use. Patient is improved, resting quietly and comfortably. The patient will be discharged home. The patient was reassured that these symptoms do not appear to represent a serious or life threatening condition at this time. Warning signs of worsening condition were discussed and understood by the patient. Based on patient's age, coexisting illness, exam, and the results of this ED evaluation, the decision to treat as an outpatient was made. Based on the information available at time of discharge, acute pathology requiring immediate intervention was deemed relative unlikely. While it is impossible to completely exclude the possibility of underlying serious disease or worsening of condition, I feel the relative likelihood is extremely low. I discussed this uncertainty with the patient, who understood ED evaluation and treatment and felt comfortable with the outpatient treatment plan. All questions regarding care, test results, and follow up were answered. The patient is stable and appropriate to discharge. They understand that they should return to the emergency department for any new or worsening symptoms. I stressed the importance of follow up for repeat assessment and possibly further evaluation/treatment. Dictation disclaimer:  Please note that this dictation was completed with [employer] ([employer]), the computer voice recognition software. Quite often unanticipated grammatical, syntax, homophones, and other interpretive errors are inadvertently transcribed by the computer software. Please disregard these errors. Please excuse any errors that have escaped final proofreading. Coding Diagnoses     Clinical Impression:   1. Chest pain, unspecified type    2. Productive cough    3. Cigarette smoker        Disposition     Disposition: Discharge.     Farhana Nelson Jaciel Marroquin M.D. [facility] Board Certified Emergency Physician    Provider Attestation:  If a scribe was utilized in generation of this patient record, I personally performed the services described in the documentation, reviewed the documentation, as recorded by the scribe in my presence, and it accurately records the patient's history of presenting illness, review of systems, patient physical examination, and procedures performed by me as the attending physician. Cathy Skinner M.D.   [facility] Board Certified Emergency Physician  2/23/2021.  6:53 AM

## 2021-04-02 ENCOUNTER — HOSPITAL ENCOUNTER (INPATIENT)
Age: 51
LOS: 10 days | Discharge: HOME | DRG: 751 | End: 2021-04-12
Attending: EMERGENCY MEDICINE | Admitting: PSYCHIATRY & NEUROLOGY
Payer: COMMERCIAL

## 2021-04-02 DIAGNOSIS — G89.29 CHRONIC LOW BACK PAIN WITHOUT SCIATICA, UNSPECIFIED BACK PAIN LATERALITY: Primary | ICD-10-CM

## 2021-04-02 DIAGNOSIS — M54.50 CHRONIC LOW BACK PAIN WITHOUT SCIATICA, UNSPECIFIED BACK PAIN LATERALITY: Primary | ICD-10-CM

## 2021-04-02 DIAGNOSIS — R45.851 SUICIDAL IDEATION: ICD-10-CM

## 2021-04-02 PROBLEM — F32.A DEPRESSION: Status: ACTIVE | Noted: 2021-04-02

## 2021-04-02 LAB
ALBUMIN SERPL-MCNC: 4.4 G/DL (ref 3.4–5)
ALBUMIN/GLOB SERPL: 1.2 {RATIO} (ref 0.8–1.7)
ALP SERPL-CCNC: 110 U/L (ref 45–117)
ALT SERPL-CCNC: 36 U/L (ref 16–61)
AMPHET UR QL SCN: NEGATIVE
ANION GAP SERPL CALC-SCNC: 8 MMOL/L (ref 3–18)
APPEARANCE UR: CLEAR
AST SERPL-CCNC: 12 U/L (ref 10–38)
BACTERIA URNS QL MICRO: ABNORMAL /HPF
BARBITURATES UR QL SCN: NEGATIVE
BASOPHILS # BLD: 0 K/UL (ref 0–0.1)
BASOPHILS NFR BLD: 0 % (ref 0–2)
BENZODIAZ UR QL: NEGATIVE
BILIRUB SERPL-MCNC: 0.7 MG/DL (ref 0.2–1)
BILIRUB UR QL: NEGATIVE
BUN SERPL-MCNC: 17 MG/DL (ref 7–18)
BUN/CREAT SERPL: 20 (ref 12–20)
CALCIUM SERPL-MCNC: 9.2 MG/DL (ref 8.5–10.1)
CANNABINOIDS UR QL SCN: NEGATIVE
CHLORIDE SERPL-SCNC: 106 MMOL/L (ref 100–111)
CO2 SERPL-SCNC: 26 MMOL/L (ref 21–32)
COCAINE UR QL SCN: POSITIVE
COLOR UR: ABNORMAL
CREAT SERPL-MCNC: 0.84 MG/DL (ref 0.6–1.3)
DIFFERENTIAL METHOD BLD: ABNORMAL
EOSINOPHIL # BLD: 0.1 K/UL (ref 0–0.4)
EOSINOPHIL NFR BLD: 1 % (ref 0–5)
ERYTHROCYTE [DISTWIDTH] IN BLOOD BY AUTOMATED COUNT: 15.2 % (ref 11.6–14.5)
ETHANOL SERPL-MCNC: <3 MG/DL (ref 0–3)
GLOBULIN SER CALC-MCNC: 3.6 G/DL (ref 2–4)
GLUCOSE SERPL-MCNC: 96 MG/DL (ref 74–99)
GLUCOSE UR STRIP.AUTO-MCNC: 100 MG/DL
HCT VFR BLD AUTO: 49 % (ref 36–48)
HDSCOM,HDSCOM: ABNORMAL
HGB BLD-MCNC: 16.4 G/DL (ref 13–16)
HGB UR QL STRIP: NEGATIVE
KETONES UR QL STRIP.AUTO: NEGATIVE MG/DL
LEUKOCYTE ESTERASE UR QL STRIP.AUTO: NEGATIVE
LYMPHOCYTES # BLD: 2.1 K/UL (ref 0.9–3.6)
LYMPHOCYTES NFR BLD: 21 % (ref 21–52)
MCH RBC QN AUTO: 24.1 PG (ref 24–34)
MCHC RBC AUTO-ENTMCNC: 33.5 G/DL (ref 31–37)
MCV RBC AUTO: 72.1 FL (ref 74–97)
METHADONE UR QL: NEGATIVE
MONOCYTES # BLD: 0.9 K/UL (ref 0.05–1.2)
MONOCYTES NFR BLD: 10 % (ref 3–10)
MUCOUS THREADS URNS QL MICRO: ABNORMAL /LPF
NEUTS SEG # BLD: 6.6 K/UL (ref 1.8–8)
NEUTS SEG NFR BLD: 68 % (ref 40–73)
NITRITE UR QL STRIP.AUTO: NEGATIVE
OPIATES UR QL: POSITIVE
PCP UR QL: NEGATIVE
PH UR STRIP: 5 [PH] (ref 5–8)
PLATELET # BLD AUTO: 275 K/UL (ref 135–420)
PMV BLD AUTO: 10.1 FL (ref 9.2–11.8)
POTASSIUM SERPL-SCNC: 4.2 MMOL/L (ref 3.5–5.5)
PROT SERPL-MCNC: 8 G/DL (ref 6.4–8.2)
PROT UR STRIP-MCNC: 30 MG/DL
RBC # BLD AUTO: 6.8 M/UL (ref 4.7–5.5)
SODIUM SERPL-SCNC: 140 MMOL/L (ref 136–145)
SP GR UR REFRACTOMETRY: >1.03 (ref 1–1.03)
UROBILINOGEN UR QL STRIP.AUTO: 1 EU/DL (ref 0.2–1)
WBC # BLD AUTO: 9.8 K/UL (ref 4.6–13.2)

## 2021-04-02 PROCEDURE — 82077 ASSAY SPEC XCP UR&BREATH IA: CPT

## 2021-04-02 PROCEDURE — 87635 SARS-COV-2 COVID-19 AMP PRB: CPT

## 2021-04-02 PROCEDURE — 65220000003 HC RM SEMIPRIVATE PSYCH

## 2021-04-02 PROCEDURE — 80053 COMPREHEN METABOLIC PANEL: CPT

## 2021-04-02 PROCEDURE — 80307 DRUG TEST PRSMV CHEM ANLYZR: CPT

## 2021-04-02 PROCEDURE — 99284 EMERGENCY DEPT VISIT MOD MDM: CPT

## 2021-04-02 PROCEDURE — 85025 COMPLETE CBC W/AUTO DIFF WBC: CPT

## 2021-04-02 PROCEDURE — 81001 URINALYSIS AUTO W/SCOPE: CPT

## 2021-04-02 RX ORDER — LORAZEPAM 1 MG/1
2 TABLET ORAL
Status: COMPLETED | OUTPATIENT
Start: 2021-04-02 | End: 2021-04-03

## 2021-04-02 NOTE — ED TRIAGE NOTES
Pt reports SI with a plan to take pills. Pt recovering heroin addiction with last use 9 months ago. Pt prescribed Saboxone with last dose 21 March 21. Hx of depression, PTSD, anxiety.   Pt is homeless with car and belongings impounded after being arrested and released from correction yesterday

## 2021-04-02 NOTE — ED PROVIDER NOTES
EMERGENCY DEPARTMENT HISTORY AND PHYSICAL EXAM      Date: 4/2/2021  Patient Name: Prashanth Gutierrez    History of Presenting Illness     Chief Complaint   Patient presents with   [address] Problem       History (Context): Prashanth Gutierrez is a 44 y.o. gentleman presents with acute onset, severe, focal suicidal ideation without exacerbating/relieving features or other associated symptoms. The patient does have a plan. The plan was to overdose on medications. The patient is recently experiencing acute stress due to the multitude of issues. . The patient does not have access to guns. The patient does have a history of suicide attempts. On review of systems, the patient denies fever, chills, rashes, hallucinations, homicidal ideation, staying up all night, drug use, recent changes to meds. PCP: None    Current Facility-Administered Medications   Medication Dose Route Frequency Provider Last Rate Last Admin    LORazepam (ATIVAN) tablet 2 mg  2 mg Oral NOW Anibal Arzola MD         Current Outpatient Medications   Medication Sig Dispense Refill    cyclobenzaprine (FLEXERIL) 10 mg tablet Take 1 Tab by mouth three (3) times daily as needed for Muscle Spasm(s). 12 Tab 0    alprazolam (XANAX PO) Take 3 mg by mouth.  pregabalin (LYRICA) 150 mg capsule Take  by mouth two (2) times a day.  citalopram (CELEXA) 40 mg tablet Take 40 mg by mouth daily.  diclofenac (VOLTAREN) 1 % gel Apply 2 g to affected area four (4) times daily. 1 Each 0       Past History     Past Medical History:  Past Medical History:   Diagnosis Date    Anxiety     Cervicalgia     Fibromyalgia     GERD (gastroesophageal reflux disease)     Lower back pain     and upper    Neuropathy     Panic attacks     Rectal bleeding 5-6 years ago    has had cy and EGD by Dr. Jonathan Bradshaw.  CY showed per patient internal hemorrhoids and EGD showed GERD       Past Surgical History:  Past Surgical History:   Procedure Laterality Date    HX APPENDECTOMY      HX LIPOMA RESECTION      HX OTHER SURGICAL      excision of lipoma upper back       Family History:  Family History   Problem Relation Age of Onset    Diabetes Mother     Hypertension Mother     Stroke Mother     Coronary Artery Disease Mother     Diabetes Father     Heart Surgery Father         Patient has had heart valve replacement       Social History:  Social History     Tobacco Use    Smoking status: Current Every Day Smoker     Packs/day: 1.00     Years: 30.00     Pack years: 30.00    Smokeless tobacco: Never Used    Tobacco comment: Pt counseled to stop smoking. Substance Use Topics    Alcohol use: No     Alcohol/week: 0.0 standard drinks     Comment: less than social    Drug use: No       Allergies: Allergies   Allergen Reactions    Pcn [Penicillins] Hives     Other reaction(s): anaphylaxis/angioedema  Throat closed up    Gabapentin Other (comments)     Black out and hit the floor    Ibuprofen Hives, Nausea and Vomiting and Other (comments)     GI upset. Pt can tolerate ketorolac/torodal without any problems. PMH, PSH, family history, social history, allergies reviewed with the patient with significant items noted above. Review of Systems   As per HPI, otherwise reviewed and negative. Physical Exam     Vitals:    04/02/21 1542   BP: 128/81   Pulse: 86   Resp: 16   Temp: 98.2 °F (36.8 °C)   SpO2: 100%   Weight: 81.6 kg (180 lb)   Height: 5' 10\" (1.778 m)       Gen: Well-appearing, in no acute distress  HEENT: Normocephalic, sclera anicteric  Cardiovascular: Normal rate, regular rhythm, no murmurs, rubs, gallops. Pulses intact and equal distally. Pulmonary: No respiratory distress. No stridor. Clear lungs. ABD: Soft, nontender, nondistended. Neuro: Alert. Oriented. Normal speech. Normal mentation.   Psych: Appearance: Normal, Speech: Normal, Thought content: Suicidal and related to situation, Thought process: Normal, Mood: Sad, Affect: Congruent  : No CVA tenderness  EXT: Moves all extremities well. No cyanosis or clubbing. Skin: Warm and well-perfused. Diagnostic Study Results     Labs -     Recent Results (from the past 12 hour(s))   CBC WITH AUTOMATED DIFF    Collection Time: 04/02/21  3:45 PM   Result Value Ref Range    WBC 9.8 4.6 - 13.2 K/uL    RBC 6.80 (H) 4.70 - 5.50 M/uL    HGB 16.4 (H) 13.0 - 16.0 g/dL    HCT 49.0 (H) 36.0 - 48.0 %    MCV 72.1 (L) 74.0 - 97.0 FL    MCH 24.1 24.0 - 34.0 PG    MCHC 33.5 31.0 - 37.0 g/dL    RDW 15.2 (H) 11.6 - 14.5 %    PLATELET 795 047 - 073 K/uL    MPV 10.1 9.2 - 11.8 FL    NEUTROPHILS 68 40 - 73 %    LYMPHOCYTES 21 21 - 52 %    MONOCYTES 10 3 - 10 %    EOSINOPHILS 1 0 - 5 %    BASOPHILS 0 0 - 2 %    ABS. NEUTROPHILS 6.6 1.8 - 8.0 K/UL    ABS. LYMPHOCYTES 2.1 0.9 - 3.6 K/UL    ABS. MONOCYTES 0.9 0.05 - 1.2 K/UL    ABS. EOSINOPHILS 0.1 0.0 - 0.4 K/UL    ABS. BASOPHILS 0.0 0.0 - 0.1 K/UL    DF AUTOMATED     METABOLIC PANEL, COMPREHENSIVE    Collection Time: 04/02/21  3:45 PM   Result Value Ref Range    Sodium 140 136 - 145 mmol/L    Potassium 4.2 3.5 - 5.5 mmol/L    Chloride 106 100 - 111 mmol/L    CO2 26 21 - 32 mmol/L    Anion gap 8 3.0 - 18 mmol/L    Glucose 96 74 - 99 mg/dL    BUN 17 7.0 - 18 MG/DL    Creatinine 0.84 0.6 - 1.3 MG/DL    BUN/Creatinine ratio 20 12 - 20      GFR est AA >60 >60 ml/min/1.73m2    GFR est non-AA >60 >60 ml/min/1.73m2    Calcium 9.2 8.5 - 10.1 MG/DL    Bilirubin, total 0.7 0.2 - 1.0 MG/DL    ALT (SGPT) 36 16 - 61 U/L    AST (SGOT) 12 10 - 38 U/L    Alk.  phosphatase 110 45 - 117 U/L    Protein, total 8.0 6.4 - 8.2 g/dL    Albumin 4.4 3.4 - 5.0 g/dL    Globulin 3.6 2.0 - 4.0 g/dL    A-G Ratio 1.2 0.8 - 1.7     ETHYL ALCOHOL    Collection Time: 04/02/21  3:45 PM   Result Value Ref Range    ALCOHOL(ETHYL),SERUM <3 0 - 3 MG/DL   URINALYSIS W/ RFLX MICROSCOPIC    Collection Time: 04/02/21  3:54 PM   Result Value Ref Range    Color DARK YELLOW      Appearance CLEAR      Specific gravity >1.030 (H) 1.005 - 1.030    pH (UA) 5.0 5.0 - 8.0      Protein 30 (A) NEG mg/dL    Glucose 100 (A) NEG mg/dL    Ketone Negative NEG mg/dL    Bilirubin Negative NEG      Blood Negative NEG      Urobilinogen 1.0 0.2 - 1.0 EU/dL    Nitrites Negative NEG      Leukocyte Esterase Negative NEG     DRUG SCREEN, URINE    Collection Time: 04/02/21  3:54 PM   Result Value Ref Range    BENZODIAZEPINES Negative NEG      BARBITURATES Negative NEG      THC (TH-CANNABINOL) Negative NEG      OPIATES Positive (A) NEG      PCP(PHENCYCLIDINE) Negative NEG      COCAINE Positive (A) NEG      AMPHETAMINES Negative NEG      METHADONE Negative NEG      HDSCOM (NOTE)    URINE MICROSCOPIC ONLY    Collection Time: 04/02/21  3:54 PM   Result Value Ref Range    Bacteria 1+ (A) NEG /hpf    Mucus 2+ (A) NEG /lpf       Radiologic Studies -   No orders to display     CT Results  (Last 48 hours)    None        CXR Results  (Last 48 hours)    None            Medical Decision Making   I am the first provider for this patient. I reviewed the vital signs, available nursing notes, past medical history, past surgical history, family history and social history. Vital Signs-Reviewed the patient's vital signs. Records Reviewed: Personally, on initial evaluation    MDM:   Patient presents with suicidal ideation. The patient does not have other medical complaints. Exam significant for normal appearance, normal speech, normal thought processes, mood sad, affect congruent. Patient is high risk for suicide.    DDX considered: Suicidal ideation  DDX thought to be less likely but also considered due to high risk condition: HI, malingering, secondary gain    Plan:   Physician hold  Medical clearance with basic labs  Psychiatric consultation    Orders as below:  Orders Placed This Encounter    CBC WITH AUTOMATED DIFF    COMPREHENSIVE METABOLIC PANEL    ETHYL ALCOHOL    URINALYSIS W/ RFLX MICROSCOPIC    Urine Drug Screen    URINE MICROSCOPIC ONLY    SARS-COV-2    EKG, 12 LEAD, INITIAL    SUICIDE PRECAUTIONS    LORazepam (ATIVAN) tablet 2 mg        ED Course:      Patient medically clear. Patient presented to psychiatry, who will admit the patient here. Critical Care Time:  The services I provided to this patient were to treat and/or prevent clinically significant deterioration that could result in the failure of one or more body systems and/or organ systems due to suicidal ideation (acute psychiatric emergency). Services included the following:  -reviewing nursing notes and old charts  -vital sign assessments  -direct patient care  -medication orders and management  -interpreting and reviewing diagnostic studies/labs  -re-evaluations  -documentation time    Aggregate critical care time was 40 minutes, which includes only time during which I was engaged in work directly related to the patient's care as described above, whether I was at bedside or elsewhere in the Emergency Department. It did not include time spent performing other reported procedures or the services of residents, students, nurses, or advance practice providers. DMT        Diagnosis     Clinical Impression:   1. Suicidal ideation        Signed,  Doug Chase MD  Emergency Physician  [facility]  [facility]    As a voice dictation software was utilized to dictate this note, minor word transpositions can occur. I apologize for confusing wording and typographic errors. Please feel free to contact me for clarification.

## 2021-04-03 PROBLEM — F33.2 MDD (MAJOR DEPRESSIVE DISORDER), RECURRENT SEVERE, WITHOUT PSYCHOSIS (HCC): Status: ACTIVE | Noted: 2021-04-03

## 2021-04-03 LAB
COVID-19 RAPID TEST, COVR: NOT DETECTED
SARS-COV-2, COV2: NORMAL
SOURCE, COVRS: NORMAL

## 2021-04-03 PROCEDURE — 99222 1ST HOSP IP/OBS MODERATE 55: CPT | Performed by: PSYCHIATRY & NEUROLOGY

## 2021-04-03 PROCEDURE — 74011250637 HC RX REV CODE- 250/637: Performed by: PSYCHIATRY & NEUROLOGY

## 2021-04-03 PROCEDURE — 65220000003 HC RM SEMIPRIVATE PSYCH

## 2021-04-03 PROCEDURE — 93005 ELECTROCARDIOGRAM TRACING: CPT

## 2021-04-03 PROCEDURE — 74011250637 HC RX REV CODE- 250/637: Performed by: EMERGENCY MEDICINE

## 2021-04-03 RX ORDER — QUETIAPINE FUMARATE 100 MG/1
100 TABLET, FILM COATED ORAL
Status: DISCONTINUED | OUTPATIENT
Start: 2021-04-03 | End: 2021-04-12 | Stop reason: HOSPADM

## 2021-04-03 RX ORDER — HYDROXYZINE PAMOATE 50 MG/1
50 CAPSULE ORAL
Status: DISCONTINUED | OUTPATIENT
Start: 2021-04-03 | End: 2021-04-09

## 2021-04-03 RX ORDER — VENLAFAXINE HYDROCHLORIDE 150 MG/1
150 CAPSULE, EXTENDED RELEASE ORAL
Status: DISCONTINUED | OUTPATIENT
Start: 2021-04-03 | End: 2021-04-07

## 2021-04-03 RX ORDER — ROPINIROLE 0.25 MG/1
0.5 TABLET, FILM COATED ORAL
Status: DISCONTINUED | OUTPATIENT
Start: 2021-04-03 | End: 2021-04-12 | Stop reason: HOSPADM

## 2021-04-03 RX ORDER — PREGABALIN 75 MG/1
150 CAPSULE ORAL 2 TIMES DAILY
Status: DISCONTINUED | OUTPATIENT
Start: 2021-04-03 | End: 2021-04-12 | Stop reason: HOSPADM

## 2021-04-03 RX ORDER — ACETAMINOPHEN 325 MG/1
650 TABLET ORAL
Status: DISCONTINUED | OUTPATIENT
Start: 2021-04-03 | End: 2021-04-12 | Stop reason: HOSPADM

## 2021-04-03 RX ORDER — MIRTAZAPINE 15 MG/1
15 TABLET, FILM COATED ORAL
Status: DISCONTINUED | OUTPATIENT
Start: 2021-04-03 | End: 2021-04-12 | Stop reason: HOSPADM

## 2021-04-03 RX ADMIN — VENLAFAXINE HYDROCHLORIDE 150 MG: 150 CAPSULE, EXTENDED RELEASE ORAL at 13:41

## 2021-04-03 RX ADMIN — ACETAMINOPHEN 650 MG: 325 TABLET ORAL at 14:14

## 2021-04-03 RX ADMIN — ROPINIROLE HYDROCHLORIDE 0.5 MG: 0.25 TABLET, FILM COATED ORAL at 20:08

## 2021-04-03 RX ADMIN — MIRTAZAPINE 15 MG: 15 TABLET, FILM COATED ORAL at 02:32

## 2021-04-03 RX ADMIN — MIRTAZAPINE 15 MG: 15 TABLET, FILM COATED ORAL at 20:08

## 2021-04-03 RX ADMIN — PREGABALIN 150 MG: 75 CAPSULE ORAL at 20:08

## 2021-04-03 RX ADMIN — QUETIAPINE FUMARATE 100 MG: 100 TABLET ORAL at 20:08

## 2021-04-03 RX ADMIN — LORAZEPAM 2 MG: 1 TABLET ORAL at 00:04

## 2021-04-03 NOTE — ROUTINE PROCESS
Pt spent 14 days in [facility]  for failing to appear to court for suspended licence. He got out  Of [facility] 2 days ago and  is currently homeless. Pt is drug user, positive for cocaine and opoides. Pt was clean for 9 months and relapsed 2 days ago. He is depressed, anxious, and suicidal with plan to overdose on medication due to multiple issues. Pt alert and oriented, pleasant and respectful no behavior issues noted. Patient  reports of left eye blindness glasses in place, wear dentures and requesting for soft diet. Pt compliant with admission process, denies SI/HI/AVH with no safety concern. Patient encouraged to continue with use of non slip footwear to ambulate. Every 15 minutes rounding continues.

## 2021-04-03 NOTE — BH NOTES
Pt arrived at approximately 0147 hours. He appeared to have slept for 3.50 hours thus far. Will continue to monitor for safety.

## 2021-04-03 NOTE — H&P
Psychiatry History and Physical    Subjective:     Date of Evaluation:  4/3/2021    Reason for Referral:  Phoebe Shay was referred to the examiners from ED/[facility] for SI with pan to OD. History of Presenting Problem: 51Y/O c MALE IN some discomfort-neck pain, well nourished and developed. Prior admit to [facility]/Psych. Has  History of past suicide attempts. Adits to SI but denies HI/AH/VH. Admits to self cutting many years ago. Reports compliance with Psych meds Tox screen positive for Opiates and Cocaine Has history of incarceration. Co today of neck/ower ext and hip pain -chronic problem. Patient Active Problem List    Diagnosis Date Noted    Depression 04/02/2021    Back pain of thoracolumbar region 11/06/2015    Muscle spasm of back 11/06/2015    Lipoma of back 08/31/2015    Cervicalgia     Lower back pain     Rectal bleeding     GERD (gastroesophageal reflux disease)     Panic attacks     Blood in stool 06/14/2010    Knee pain 06/14/2010    Knee pain 06/14/2010     Past Medical History:   Diagnosis Date    Anxiety     Cervicalgia     Fibromyalgia     GERD (gastroesophageal reflux disease)     Lower back pain     and upper    Neuropathy     Panic attacks     Rectal bleeding 5-6 years ago    has had cy and EGD by Dr. Suman Aguilar.  CY showed per patient internal hemorrhoids and EGD showed GERD     Past Surgical History:   Procedure Laterality Date    HX APPENDECTOMY      HX LIPOMA RESECTION      HX OTHER SURGICAL      excision of lipoma upper back       Family History   Problem Relation Age of Onset    Diabetes Mother     Hypertension Mother     Stroke Mother     Coronary Artery Disease Mother     Diabetes Father     Heart Surgery Father         Patient has had heart valve replacement      Social History     Tobacco Use    Smoking status: Current Every Day Smoker     Packs/day: 1.00     Years: 30.00     Pack years: 30.00    Smokeless tobacco: Never Used    Tobacco comment: Pt counseled to stop smoking. Substance Use Topics    Alcohol use: No     Alcohol/week: 0.0 standard drinks     Comment: less than social     Prior to Admission medications    Medication Sig Start Date End Date Taking? Authorizing Provider   cyclobenzaprine (FLEXERIL) 10 mg tablet Take 1 Tab by mouth three (3) times daily as needed for Muscle Spasm(s). 2/23/21   Jerrica Holland MD   alprazolam (XANAX PO) Take 3 mg by mouth. Lenin Craig MD   pregabalin (LYRICA) 150 mg capsule Take  by mouth two (2) times a day. Lenin Craig MD   citalopram (CELEXA) 40 mg tablet Take 40 mg by mouth daily. Lenin Craig MD   diclofenac (VOLTAREN) 1 % gel Apply 2 g to affected area four (4) times daily. 12/16/19   Mame Bhatti NP     Allergies   Allergen Reactions    Pcn [Penicillins] Hives     Other reaction(s): anaphylaxis/angioedema  Throat closed up    Gabapentin Other (comments)     Black out and hit the floor    Ibuprofen Hives, Nausea and Vomiting and Other (comments)     GI upset. Pt can tolerate ketorolac/torodal without any problems.         Review of Systems - History obtained from chart review and the patient  General ROS: negative  Psychological ROS: positive for - depression and suicidal ideation  Ophthalmic ROS: negative  ENT ROS: negative  Respiratory ROS: no cough, shortness of breath, or wheezing  Cardiovascular ROS: no chest pain or dyspnea on exertion  Gastrointestinal ROS: no abdominal pain, change in bowel habits, or black or bloody stools  Genito-Urinary ROS: no dysuria, trouble voiding, or hematuria  Musculoskeletal ROS: neck and leg/hip pain  Neurological ROS: positive for - deies  Dermatological ROS: negative      Objective:     Patient Vitals for the past 8 hrs:   BP Temp Pulse Resp SpO2   04/03/21 0845 134/88 97 °F (36.1 °C) 94 20    04/03/21 0228 124/81  96 17 98 %       Mental Status exam: WNL except for    Sensorium  oriented to time, place and person   Orientation person, place, time/date and situation   Relations cooperative   Eye Contact appropriate   Appearance:  age appropriate and within normal Limits   Motor Behavior:  gait unsteady   Speech:  soft   Vocabulary average   Thought Process: logical   Thought Content free of delusions and free of hallucinations   Suicidal ideations intention   Homicidal ideations no plan  and no intention   Mood:  depressed and sad   Affect:  depressed and sad   Memory recent  adequate   Memory remote:  adequate   Concentration:  adequate   Abstraction:  concrete   Insight:  fair   Reliability fair   Judgment:  fair         Physical Exam:   Visit Vitals  /88 (BP 1 Location: Right arm, BP Patient Position: Sitting)   Pulse 94   Temp 97 °F (36.1 °C)   Resp 20   Ht 5' 10\" (1.778 m)   Wt 81.6 kg (180 lb)   SpO2 98%   BMI 25.83 kg/m²     General appearance: alert, cooperative, no distress, appears stated age  Head: Normocephalic, without obvious abnormality, atraumatic  Eyes: negative  Ears: normal TM's and external ear canals AU  Nose: Nares normal. Septum midline. Mucosa normal. No drainage or sinus tenderness. Throat: Lips, mucosa, and tongue normal. Teeth and gums normal  Neck: supple, symmetrical, trachea midline, no adenopathy, thyroid: not enlarged, symmetric, no tenderness/mass/nodules, no carotid bruit and no JVD  Back: symmetric, no curvature. ROM normal. No CVA tenderness. Lungs: clear to auscultation bilaterally  Chest wall: no tenderness  Heart: regular rate and rhythm, S1, S2 normal, no murmur, click, rub or gallop  Abdomen: soft, non-tender.  Bowel sounds normal. No masses,  no organomegaly  Extremities: extremities normal, atraumatic, no cyanosis or edema  Pulses: 2+ and symmetric  Skin: Skin color, texture, turgor normal. No rashes or lesions  Lymph nodes: Cervical, supraclavicular, and axillary nodes normal.  Neurologic: Grossly normal        Impression:      Active Problems:    Depression (4/2/2021)          Plan:     Recommendations for Treatment/Conditions:  Psychiatric treatment recommended while in hospital  Admit to Psych services for SI with plan to OD and Depression/PTSD    Referral To:    Inpatient psychiatric care      Phani BOYER [id number]   4/3/2021 9:40 AM

## 2021-04-03 NOTE — ROUTINE PROCESS
Patient's home medication: 
 Quetiapine 100 mg tab ( 26 pills) 23 tabs of Clyclobenzaprine 10 mg, 
 10 tabs of Clonidine 0.2 mg, 
 26 tabs of mittazapine 15 mg, 
26 tabs of ropinirole 0.5 mg, 
24 tabs of hydroxyzine Hcl 25 mg, and  
34 tabs of tylenol 500 mg. All medications in patient's locker.

## 2021-04-03 NOTE — GROUP NOTE
BRENT  GROUP DOCUMENTATION INDIVIDUAL Group Therapy Note Date: 4/3/2021 Group Start Time: 9380 Group End Time: 0830 Group Topic: Nursing [facility] ADULT CHEM DEP Robin Bolaños IP  GROUP DOCUMENTATION GROUP Group Therapy Note Attendees: 4 Attendance: Did not attend Additional Notes:  Pt did not attend despite staff encouragement Alma Johnson
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
## 2021-04-03 NOTE — BSMART NOTE
Comprehensive Assessment Integrated Summary Patient is a 44year old male who presented to the emergency room with c/o \"fellinhg sad, depressed, suicidal thoughts, and a plan to take all my medications. \" Patient stated that he stressed d/t car being towed and he was supposed to start a new job on last Monday, but got arrested for failure to appear d/t driving on a suspended license. Patient added hat he does not have a place to live. Patient also added that he started snorting heroin when I got out of longterm. Patient said that he snorted 2 caps ($20.00) worth of heroin. Patient denied thoughts of harm towards others; denied hallucinations. Mental Status Exam 
 
The patient's appearance is unkempt. The patient's behavior calm, cooperative. The patient is oriented to time, place, person and situation. The patient's speech shows no evidence of impairment. The patient's mood is depressed. The range of affect is flat. The patient's thought content demonstrates no evidence of impairment. The thought process shows no evidence of impairment. The patient's perception shows no evidence of impairment. The patient's memory shows no evidence of impairment. The patient's appetite shows no evidence of impairment. The patient's sleep shows no evidence of impairment. DME: None Legal: \"just released from longterm on yesterday, been in longterm since 3/22/21, for failure to appear d/t driving on suspended license. \" Housing: \"No place to live\" Education: \"Associate Degree in Computer [employer]ing and Design\" Access to weapons: Denied Substance Abuse: \"heroin,\" UDS also (+) for cocaine. Outpatient Care: \"Dr. Vincent Goyal, last appointment, 3 months, ago. Inpatient Services: \"[facility], [place], [place], can't recall name of hospital; [facility], many times as a child. \" 
Contact/Support Person: Mary Ann Newberry, mother, [phone]\" Disposition Patient is receptive to voluntary admission at [facility] for inpatient services; discussed with on-call psychiatrist, admission orders received, and report called to charge nurse.  
 
Maverick Siu, RN, BSN

## 2021-04-03 NOTE — BH NOTES
Pt presents with dull affect, depressed mood. Pt has been withdrawn to self on the unit, resting in his room for much of the afternoon. Pt ate well during meals. Pt denies SI/HI at this time. Will continue to monitor.

## 2021-04-04 LAB
ATRIAL RATE: 93 BPM
CALCULATED P AXIS, ECG09: 69 DEGREES
CALCULATED R AXIS, ECG10: 50 DEGREES
CALCULATED T AXIS, ECG11: 32 DEGREES
DIAGNOSIS, 93000: NORMAL
P-R INTERVAL, ECG05: 140 MS
Q-T INTERVAL, ECG07: 342 MS
QRS DURATION, ECG06: 86 MS
QTC CALCULATION (BEZET), ECG08: 425 MS
VENTRICULAR RATE, ECG03: 93 BPM

## 2021-04-04 PROCEDURE — 65220000003 HC RM SEMIPRIVATE PSYCH

## 2021-04-04 PROCEDURE — 74011250637 HC RX REV CODE- 250/637: Performed by: PSYCHIATRY & NEUROLOGY

## 2021-04-04 PROCEDURE — 99231 SBSQ HOSP IP/OBS SF/LOW 25: CPT | Performed by: PSYCHIATRY & NEUROLOGY

## 2021-04-04 RX ORDER — CARISOPRODOL 350 MG/1
350 TABLET ORAL
Status: DISCONTINUED | OUTPATIENT
Start: 2021-04-04 | End: 2021-04-12 | Stop reason: HOSPADM

## 2021-04-04 RX ADMIN — QUETIAPINE FUMARATE 100 MG: 100 TABLET ORAL at 20:10

## 2021-04-04 RX ADMIN — VENLAFAXINE HYDROCHLORIDE 150 MG: 150 CAPSULE, EXTENDED RELEASE ORAL at 08:52

## 2021-04-04 RX ADMIN — CARISOPRODOL 350 MG: 350 TABLET ORAL at 20:12

## 2021-04-04 RX ADMIN — PREGABALIN 150 MG: 75 CAPSULE ORAL at 08:52

## 2021-04-04 RX ADMIN — PREGABALIN 150 MG: 75 CAPSULE ORAL at 20:10

## 2021-04-04 RX ADMIN — ACETAMINOPHEN 650 MG: 325 TABLET ORAL at 08:52

## 2021-04-04 RX ADMIN — MIRTAZAPINE 15 MG: 15 TABLET, FILM COATED ORAL at 20:10

## 2021-04-04 RX ADMIN — CARISOPRODOL 350 MG: 350 TABLET ORAL at 13:41

## 2021-04-04 RX ADMIN — ROPINIROLE HYDROCHLORIDE 0.5 MG: 0.25 TABLET, FILM COATED ORAL at 20:10

## 2021-04-04 NOTE — PROGRESS NOTES
Problem: Falls - Risk of  Goal: *Absence of Falls  Description: Document Ivy Taylor Fall Risk and appropriate interventions in the flowsheet. Outcome: Progressing Towards Goal  Note: Fall Risk Interventions:            Medication Interventions: Teach patient to arise slowly                   Problem: Suicide  Goal: *STG: Remains safe in hospital  Description: Pt will remain safe in hospital daily. Outcome: Progressing Towards Goal  Goal: *STG: Seeks staff when feelings of self harm or harm towards others arise  Description: Pt to seek staff when feelings of self harm or harm towards others arise. Outcome: Progressing Towards Goal     Pt presents with dull affect, depressed mood, irritable at times. Pt has been withdrawn to self on the unit, resting in his room for most of the afternoon. Pt denies SI/HI at this time. Pt is medication compliant, and received PRN Tylenol and Soma during this shift. Will continue to monitor.

## 2021-04-04 NOTE — GROUP NOTE
[facility] GROUP DOCUMENTATION INDIVIDUAL Group Therapy Note Date: 4/3/2021 Group Start Time: 2015 Group End Time: 2045 Group Topic: Nursing [facility] 1 ADULT CHEM DEP Faviola Campoverde RN 
 
[facility] GROUP DOCUMENTATION GROUP Group Therapy Note Attendees: 6 Attendance: Attended Patient's Goal:  Understanding medication as ordered and reflection Interventions/techniques: Informed, Validated, Provide feedback and Reinforced Follows Directions: Followed directions Interactions: Interacted appropriately Mental Status: Calm Behavior/appearance: Cooperative Goals Achieved: Able to reflect/comment on own behavior and Able to receive feedback Additional Notes:  Pt isolated to self in day area. Compliant with medication. Pt was able to give correct teach back regarding medication. Pt was able to reflect on day and reported that he was feeling much better. Pt did not have any specifics events to report. Will continue to monitor and support as needed.   
 
Robin Gamez RN

## 2021-04-04 NOTE — BH NOTES
Patient was lying in bed awake upon Staff arrival on Unit this morning. Patient ate about 80% of his Breakfast and Lunch today during shift. Patient did Not interact with Peers, and Patient only interacted with Staff, and cooperated, as deemed necessary today during shift. Patient spent majority of the shift today, in his room and lying in his bed. He only came out of his room when he wanted to eat something. Patient did Not exhibit any defiant, aggressive behavior today during shift. Staff will continue to monitor Patient for safety, behavior and location.

## 2021-04-04 NOTE — GROUP NOTE
[facility]  GROUP DOCUMENTATION INDIVIDUAL Group Therapy Note Date: 4/4/2021 Group Start Time: 5645 Group End Time: 9208 Group Topic: Nursing [facility] 1 ADULT CHEM RODRI KENNEDY  GROUP DOCUMENTATION GROUP Nursing Group Therapy Note Attendees: 8 Attendance: Attended Patient's Goal:  Spirituality Interventions/techniques: Informed Follows Directions: Followed directions Interactions: Interacted appropriately Mental Status: Calm Behavior/appearance: Cooperative Goals Achieved: Able to listen to others Kiah Sick

## 2021-04-04 NOTE — PROGRESS NOTES
[address] [facility] [address]  Inpatient Progress Note     Date of Service: 04/04/21  Hospital Day: 2     Subjective/Interval History   04/04/21    Treatment Team Notes:  Notes reviewed and/or discussed and report that Maria Isabel Jenkins is a patient with a history of polydrug use disorder, and depression, attention invited to his dictated admission note which is self-explanatory. Patient interview: Maria Isabel Jenkins was interviewed by this writer today. The patient remains rather depressed, complaining today of severe muscle spasms. He has been prescribed in the past with carosipodrol, which he described being effective. So we proceeded to prescribe meds as a as needed. Otherwise no changes in his depression intensity. No major evidence of opioid related withdrawal symptoms during this morning examination. We will continue to observe closely, however. Objective     Visit Vitals  /88 (BP 1 Location: Right arm, BP Patient Position: Sitting)   Pulse 86   Temp 98 °F (36.7 °C)   Resp 18   Ht 5' 10\" (1.778 m)   Wt 81.6 kg (180 lb)   SpO2 98%   BMI 25.83 kg/m²     Vitals are stable. Recent Results (from the past 24 hour(s))   EKG, 12 LEAD, INITIAL    Collection Time: 04/03/21  1:56 PM   Result Value Ref Range    Ventricular Rate 93 BPM    Atrial Rate 93 BPM    P-R Interval 140 ms    QRS Duration 86 ms    Q-T Interval 342 ms    QTC Calculation (Bezet) 425 ms    Calculated P Axis 69 degrees    Calculated R Axis 50 degrees    Calculated T Axis 32 degrees    Diagnosis       Normal sinus rhythm  Normal ECG  When compared with ECG of 23-FEB-2021 05:58,  No significant change was found  Confirmed by Darby Campuzano MD, Gin Koch ([id number]) on 4/4/2021 8:02:19 AM       EKG results noted. Mental Status Examination     Appearance/Hygiene 44 y.o.  WHITE male  Hygiene: Poor   Behavior/Social Relatedness Appropriate   Musculoskeletal Gait/Station: appropriate  Tone (flaccid, cogwheeling, spastic): not assessed  Psychomotor (hyperkinetic, hypokinetic): calm   Involuntary movements (tics, dyskinesias, akathisa, stereotypies): none   Speech   Rate, rhythm, volume, fluency and articulation are appropriate   Mood   depressed   Affect    mildly irritable   Thought Process Linear and goal directed   Thought Content and Perceptual Disturbances  the patient was suicidal upon admission, he is able currently to CFS while in the hospital.  Denies auditory and visual hallucinations   Sensorium and Cognition  Grossly intact   Insight  improving   Judgment  improving        Assessment/Plan      Psychiatric Diagnoses:   Patient Active Problem List   Diagnosis Code    Blood in stool K92.1    Knee pain M25.569    Knee pain M25.569    GERD (gastroesophageal reflux disease) K21.9    Panic attacks F41.0    Rectal bleeding K62.5    Cervicalgia M54.2    Lower back pain M54.5    Lipoma of back D17.1    Back pain of thoracolumbar region M54.5, M54.6    Muscle spasm of back M62.830    MDD (major depressive disorder), recurrent severe, without psychosis ([facility] [address] F33.2       Medical Diagnoses: Same    Psychosocial and contextual factors: Same    Level of impairment/disability: Moderately severe    1. Treatment will continue the same for now. He may require a dose increase of his Effexor XR to 225 mg daily. He is also being prescribed with Remeron 15 mg at bedtime. The same dose of mirtazapine will be maintained. Quetiapine appears to be helpful with his sleeping patterns, however the patient described not being able to sleep last night because of the muscle spasms that he has been having. Hopefully Simone Gibbs will be able to help  2. Reviewed instructions, risks, benefits and side effects of medications  3. Disposition/Discharge Date: self-care/to be determined.      Severo Gomez MD, [address] [place] [address]  Psychiatry

## 2021-04-05 PROCEDURE — 65220000003 HC RM SEMIPRIVATE PSYCH

## 2021-04-05 PROCEDURE — 99232 SBSQ HOSP IP/OBS MODERATE 35: CPT | Performed by: PSYCHIATRY & NEUROLOGY

## 2021-04-05 PROCEDURE — 74011250637 HC RX REV CODE- 250/637: Performed by: PSYCHIATRY & NEUROLOGY

## 2021-04-05 RX ADMIN — QUETIAPINE FUMARATE 100 MG: 100 TABLET ORAL at 20:15

## 2021-04-05 RX ADMIN — PREGABALIN 150 MG: 75 CAPSULE ORAL at 08:21

## 2021-04-05 RX ADMIN — CARISOPRODOL 350 MG: 350 TABLET ORAL at 20:17

## 2021-04-05 RX ADMIN — VENLAFAXINE HYDROCHLORIDE 150 MG: 150 CAPSULE, EXTENDED RELEASE ORAL at 08:21

## 2021-04-05 RX ADMIN — PREGABALIN 150 MG: 75 CAPSULE ORAL at 20:15

## 2021-04-05 RX ADMIN — CARISOPRODOL 350 MG: 350 TABLET ORAL at 14:38

## 2021-04-05 RX ADMIN — MIRTAZAPINE 15 MG: 15 TABLET, FILM COATED ORAL at 20:15

## 2021-04-05 RX ADMIN — ROPINIROLE HYDROCHLORIDE 0.5 MG: 0.25 TABLET, FILM COATED ORAL at 20:15

## 2021-04-05 RX ADMIN — CARISOPRODOL 350 MG: 350 TABLET ORAL at 08:23

## 2021-04-05 NOTE — BSMART NOTE
[address] [facility] Biopsychosocial Assessment Current Level of Psychosocial Functioning  
 
[x]Independent 
[]Dependent []Minimal Assist 
 
 
Comments:   
 
Psychosocial High Risk Factors (check all that apply) [x]Unable to obtain meds []Chronic illness/pain   
[x]Substance abuse  
[]Lack of Family Support []Financial stress [x]Isolation []Inadequate Community Resources 
[]Suicide attempt(s) [x]Not taking medications []Victim of crime []Developmental Delay 
[]Unable to manage personal needs [x] Legal Issues [x]  Homeless [x]Nima transportation []Readmission within 30 days [x]Unemployment []Traumatic Event Psychiatric Advanced Directive: N/A Family to involve in treatment: Oswaldo Garcia # [phone] Sexual Orientation:  heterosexual 
 
Patient Strengths: kept Med Check appointment x3 months age, asking still for help Patient Barriers: suicidal, substance abuse, homeless, dysphoric, hopeless, medical issues CD education provided: in IT and groups Safety plan: will contract for safety with nursing staff [facility] history: numerous hospitalizations in [address] [place] [address] Plan of Care: 
medication management, group/individual therapies, family meetings, psycho -education, treatment team meetings to assist with stabilization Initial Discharge Plan:  Return to Dr Jeyson Cason Clinical Summary: This voluntary x48 yr old [de-identified] male [facility] at [facility] seeking help for depression. He described himself as being helpless and hopeless & was considering taking an overdose with multiple medications which were provided to him at the time in which he was released from [facility] around 2 days or so ago. The patient also has severe stressors, including substance abuse, legal, transportation & trauma from PTSD. Homeless & unemployed.  Self medicates with drugs & alcohol. CLINICAL IMPRESSION 
AXIS I:  Major depressive disorder, recurrent without psychotic symptoms. Rule out substance-induced mood disorder. Opioid use disorder, severe. Cocaine use disorder, severe. AXIS II:  Deferred. AXIS III:  Fibromyalgia with chronic history of pain. Restless legs syndrome. Gastroesophageal reflux disease by history. History of rectal bleeding secondary to internal hemorrhoids. Status post excision of lipoma of upper back. Status post appendectomy. History of allergy to penicillins which produce hives and adverse reactions to gabapentin which produce hypotension and ibuprofen GI symptoms. Intervention: pt going through some detox & resting in room, so unable to explain structure of Tx team & clinical expectations. Dr & tx team updated.

## 2021-04-05 NOTE — PROGRESS NOTES
[address] [facility] [address]  Inpatient Progress Note     Date of Service: 04/05/21  Hospital Day: 3     Subjective/Interval History   04/05/21    Treatment Team Notes:  Notes reviewed and/or discussed and report that Taylor Stroud is a patient recently admitted to the facility with a history of polydrug use disorder and depression. Please refer to the dictated admission note which is self-explanatory. Patient interview: Taylor Stroud was interviewed by this writer today. Still withdrawing, unable to sleep last night, and depressed, the patient was advised about his need for group therapy participation. He remains rather despondent which obviously does not help. However he is stable to CFS while in the hospital.      Objective     Visit Vitals  BP (!) 146/96 (BP 1 Location: Right arm, BP Patient Position: Sitting)   Pulse 94   Temp 97.2 °F (36.2 °C)   Resp 20   Ht 5' 10\" (1.778 m)   Wt 81.6 kg (180 lb)   SpO2 98%   BMI 25.83 kg/m²     Blood pressure remains elevated. No results found for this or any previous visit (from the past 24 hour(s)). Mental Status Examination     Appearance/Hygiene 44 y.o. WHITE male  Hygiene: Limited   Behavior/Social Relatedness Appropriate   Musculoskeletal Gait/Station: appropriate  Tone (flaccid, cogwheeling, spastic): not assessed  Psychomotor (hyperkinetic, hypokinetic): calm   Involuntary movements (tics, dyskinesias, akathisa, stereotypies): none   Speech   Rate, rhythm, volume, fluency and articulation are appropriate   Mood   depressed   Affect    appropriate to situation   Thought Process Linear and goal directed   Thought Content and Perceptual Disturbances  suicidal thoughts are less intense and less frequent.   Denies auditory and visual hallucinations, however he remains rather depressed   Sensorium and Cognition  Grossly intact   Insight  improving   Judgment  improving        Assessment/Plan      Psychiatric Diagnoses:   Patient Active Problem List Diagnosis Code    Blood in stool K92.1    Knee pain M25.569    Knee pain M25.569    GERD (gastroesophageal reflux disease) K21.9    Panic attacks F41.0    Rectal bleeding K62.5    Cervicalgia M54.2    Lower back pain M54.5    Lipoma of back D17.1    Back pain of thoracolumbar region M54.5, M54.6    Muscle spasm of back M62.830    MDD (major depressive disorder), recurrent severe, without psychosis ([facility] [address] F33.2       Medical Diagnoses: Same    Psychosocial and contextual factors: Same    Level of impairment/disability: Moderately severe    1. We will continue same combination of medications. Good tolerance noted. The patient has indicated positive treatment response to current dosing however he may require a dose increase on his venlafaxine dose. Current prescription for mirtazapine will be maintained the same, and there will be no contraindication on increasing his Effexor XR dose to 225 mg every morning. We will make a decision tomorrow. 2.  Reviewed instructions, risks, benefits and side effects of medications  3. Disposition/Discharge Date: self-care/home, to be determined.     Vidya Álvarez MD, [address] [facility]  Psychiatry

## 2021-04-05 NOTE — PROGRESS NOTES
Problem: Suicide  Goal: *STG: Remains safe in hospital  Description: Pt will remain safe in hospital daily. Outcome: Progressing Towards Goal     Problem: Crack/Cocaine Withdrawal  Goal: *STG: Complies with medication therapy  Outcome: Progressing Towards Goal     Problem: Suicide  Goal: *STG: Attends activities and groups  Outcome: Not Progressing Towards Goal    Femi Harrison has eaten his meals and not attended any groups today. He admits back spasms and difficulty sleeping. He is compliant with medications. Femi Harrison is free from falls and harm. Will continue to provide support as needed.

## 2021-04-05 NOTE — BSMART NOTE
ART THERAPY GROUP PROGRESS NOTE Group time: 940 The patient did not awaken/get up when called for group. Art Therapy Intern administered group art therapy

## 2021-04-05 NOTE — BSMART NOTE
OCCUPATIONAL THERAPY PROGRESS NOTE Group time:2542 The patient did not get up when called for group.

## 2021-04-05 NOTE — GROUP NOTE
BRENT  GROUP DOCUMENTATION INDIVIDUAL Group Therapy Note Date: 4/4/2021 Group Start Time: 2100 Group End Time: 2130 Group Topic: Nursing [facility] 1 ADULT CHEM DEP Daniel Webster RN 
 
[facility] hospital GROUP DOCUMENTATION GROUP Group Therapy Note Attendees: 10 Attendance: Attended Patient's Goal:  Importance of good hygiene to mental health Interventions/techniques: Informed and Validated Follows Directions: Followed directions Interactions: Interacted appropriately Mental Status: Calm Behavior/appearance: Cooperative Goals Achieved: Identified feelings Rolandorebecca Gutierrez RN

## 2021-04-05 NOTE — BSMART NOTE
SOCIAL WORK GROUP THERAPY PROGRESS NOTE Group Time:  10:45am 
 
Group Topic:  Coping Skills Group Participation:  
 
Pt reportedly did not attend group due to medical issues & detox issues.

## 2021-04-06 PROCEDURE — 99232 SBSQ HOSP IP/OBS MODERATE 35: CPT | Performed by: PSYCHIATRY & NEUROLOGY

## 2021-04-06 PROCEDURE — 74011250637 HC RX REV CODE- 250/637: Performed by: PSYCHIATRY & NEUROLOGY

## 2021-04-06 PROCEDURE — 65220000003 HC RM SEMIPRIVATE PSYCH

## 2021-04-06 RX ORDER — AMLODIPINE BESYLATE 5 MG/1
5 TABLET ORAL DAILY
Status: DISCONTINUED | OUTPATIENT
Start: 2021-04-06 | End: 2021-04-12 | Stop reason: HOSPADM

## 2021-04-06 RX ADMIN — ROPINIROLE HYDROCHLORIDE 0.5 MG: 0.25 TABLET, FILM COATED ORAL at 20:05

## 2021-04-06 RX ADMIN — QUETIAPINE FUMARATE 100 MG: 100 TABLET ORAL at 20:05

## 2021-04-06 RX ADMIN — CARISOPRODOL 350 MG: 350 TABLET ORAL at 14:11

## 2021-04-06 RX ADMIN — CARISOPRODOL 350 MG: 350 TABLET ORAL at 20:05

## 2021-04-06 RX ADMIN — PREGABALIN 150 MG: 75 CAPSULE ORAL at 08:10

## 2021-04-06 RX ADMIN — MIRTAZAPINE 15 MG: 15 TABLET, FILM COATED ORAL at 20:05

## 2021-04-06 RX ADMIN — CARISOPRODOL 350 MG: 350 TABLET ORAL at 08:11

## 2021-04-06 RX ADMIN — PREGABALIN 150 MG: 75 CAPSULE ORAL at 20:05

## 2021-04-06 RX ADMIN — VENLAFAXINE HYDROCHLORIDE 150 MG: 150 CAPSULE, EXTENDED RELEASE ORAL at 08:11

## 2021-04-06 NOTE — GROUP NOTE
BRENT  GROUP DOCUMENTATION INDIVIDUAL Group Therapy Note Date: 4/6/2021 Group Start Time: 0432 Group End Time: 5749 Group Topic: Nursing [facility] 1 ADULT CHEM DEP Azam Roberts RN 
 
[facility] GROUP DOCUMENTATION GROUP Group Therapy Note Attendees: 8 Attendance: Attended Patient's Goal:  Educate Interventions/techniques: Informed Follows Directions: Followed directions Interactions: Interacted appropriately Mental Status: Anxious Behavior/appearance: Cooperative Goals Achieved: Able to give feedback to another and Able to self-disclose Catalina MELISSA Hardy

## 2021-04-06 NOTE — BSMART NOTE
History:  The patient whom described himself as being helpless and hopeless was considering taking an overdose with multiple medications which were provided to him at the time in which he was released from snf around 2 days or so ago. The patient who has had severe stressors, as I have above-mentioned, decided, however, to come to the emergency room at which time due to the severity of his depression and his potential for self-harm, he was offered with inpatient psychiatric care to which he accepted and so the basis for this admission. SW made contact with patient as he remained withdrawn to his room. Patients affect is flat and sad. He reports he is feeling better and will attend group sessions to gather his thoughts. SW addressed treatment plan as well as discharge plan. SW will continue with supportive counseling and will assist as needed.  
 
MIGEUL MustafaE

## 2021-04-06 NOTE — GROUP NOTE
IP  GROUP DOCUMENTATION INDIVIDUAL Group Therapy Note Date: 4/5/2021 Group Start Time: 0800 Group End Time: 0539 Group Topic: Nursing [facility] 1 ADULT CHEM DEP Chloe Kelley., RN 
 
[facility] GROUP DOCUMENTATION GROUP Group Therapy Note: Patient's were informed of importance of knowing what medications they take, why they are prescribed and compliance with taking as prescribed. Attendees: 6 Attendance: Did not attend Additional Notes:  Patient resting Padmini Abel

## 2021-04-06 NOTE — PROGRESS NOTES
[address] [facility] [address]  Inpatient Progress Note     Date of Service: 04/06/21  Hospital Day: 4     Subjective/Interval History   04/06/21    Treatment Team Notes:  Notes reviewed and/or discussed and report that Sara Murray the patient with a history of polysubstance use disorder and depression still remains rather withdrawn. He was a strongly suggestive to begin to participating group therapy sessions, and it appears that he has being able to do so today. He has attended 2 sessions today which is much better than yesterday. Patient interview: Sara Murray was interviewed by this writer today. Depressed and withdrawn, we have considering increasing his Effexor dose to 225 every morning of a extended release presentation. However he is hypertensive, and Effexor could increase his blood pressure even higher. So we will restart treatment with Norvasc and as his hypertension improves, we will hopefully be able to increase his Effexor XR dose. He continues to have pain which is relieved to a degree with prescription for Soma, however he does describe increased pain in the morning. At home, he was taking 2 tablets of Soma before he went to bed. That is not an appropriate dosing and so he was advised that we will not prescribe him in that way. Mallory Slight is being currently prescribed on if as-needed basis. I suggested for him to take a dose around 6 or 7 PM with a repeat around 10 PM.  That may be able to help him out. Objective     Visit Vitals  BP (!) 143/100   Pulse 82   Temp 96.9 °F (36.1 °C)   Resp 20   Ht 5' 10\" (1.778 m)   Wt 81.6 kg (180 lb)   SpO2 98%   BMI 25.83 kg/m²     Blood pressure is high, treatment with Norvasc will be started. No results found for this or any previous visit (from the past 24 hour(s)). Mental Status Examination     Appearance/Hygiene 44 y.o.  WHITE male  Hygiene: Limited   Behavior/Social Relatedness Appropriate   Musculoskeletal Gait/Station: appropriate  Tone (flaccid, cogwheeling, spastic): not assessed  Psychomotor (hyperkinetic, hypokinetic): calm   Involuntary movements (tics, dyskinesias, akathisa, stereotypies): none   Speech   Rate, rhythm, volume, fluency and articulation are appropriate   Mood   depressed   Affect    flat   Thought Process Linear and goal directed   Thought Content and Perceptual Disturbances  suicidal thoughts are less intense and less frequent. Still rather despondent. Denies auditory and visual hallucinations   Sensorium and Cognition  Grossly intact   Insight  improving   Judgment  improving        Assessment/Plan      Psychiatric Diagnoses:   Patient Active Problem List   Diagnosis Code    Blood in stool K92.1    Knee pain M25.569    Knee pain M25.569    GERD (gastroesophageal reflux disease) K21.9    Panic attacks F41.0    Rectal bleeding K62.5    Cervicalgia M54.2    Lower back pain M54.5    Lipoma of back D17.1    Back pain of thoracolumbar region M54.5, M54.6    Muscle spasm of back M62.830    MDD (major depressive disorder), recurrent severe, without psychosis ([facility] [address] F33.2       Medical Diagnoses: Same    Psychosocial and contextual factors: Same    Level of impairment/disability: Severe    1. Please see above regarding medications treatment plan. Otherwise treatment participation has improved. He remains however, too depressed to be able to be discharged. 2.  Reviewed instructions, risks, benefits and side effects of medications  3. Disposition/Discharge Date: self-care/to be determined.     Dennys Keith MD, [address] [facility]  Psychiatry

## 2021-04-06 NOTE — BSMART NOTE
OCCUPATIONAL THERAPY PROGRESS NOTE Group Time:  3513 Attendance: The patient attended full group. Participation: The patient participated with minimal elaboration in the activity. Attention: The patient was able to focus on the activity. Interaction: The patient acknowledges others or responds to questions,  with no spontaneous interaction. Briefly discussed substance abuse and his tendency to worry about things out of his control.

## 2021-04-06 NOTE — BH NOTES
Patient has been resting in room most of day shift. Patient asked about SOMA order being changed for bedtime. Patient informed having two tablets at bedtime is \"more than recommended dose. \"  Patient informed he will be able to have one dose at 2011 and could have next dose at midnight since it is a \"new day. \"  Patient voiced understanding and did not appear irritated. Patient has eaten meals and snacks and has been compliant with scheduled medications. Patient compliant with unit guidelines and free from falls and harm. Will continue to monitor and provide interventions as appropriate.

## 2021-04-06 NOTE — BH NOTES
Pt became very upset during PM medication administration because per pt he is supposed to have two doses of Soma at bedtime. Pt advised order for Cleotha Cos states 4 times a day. Pt stated his Dr told him he would adjust it today. Pt advised to communicate with Dr tomorrow about having dosage adjusted. Pt aggressively threw his trash in the trash can, stating, \"I won't be able to sleep with just one! \" before walking away. Will continue to provide support as needed.

## 2021-04-06 NOTE — PROGRESS NOTES
Problem: Suicide  Goal: *STG: Attends activities and groups  Outcome: Progressing Towards Goal   Problem: Falls - Risk of  Goal: *Absence of Falls  Description: Document Reinafanta MorochoUlrich Fall Risk and appropriate interventions in the flowsheet. Outcome: Progressing Towards Goal    Problem: Crack/Cocaine Withdrawal  Goal: *STG: Complies with medication therapy  Outcome: Not Progressing Towards Goal     Dalton did not attend earlier groups but did attend OT group. He has eaten his meals today. He declined his afternoon blood pressure medication, stating \"I don't have blood pressure problems, they just put me on that while I was in retirement\". Pt remains free from falls and harm. Will continue to provide support as needed.

## 2021-04-06 NOTE — BSMART NOTE
Social Work Group 4/6/21 Positive Well-Being and Wellness Attendance  Attended Number of participants 5 Time in 1:55 Time out 2:35 Total Time 40 Interaction Listen, Participated Interventions/ 
techniques Informed and encouraged, Provided positive feedback and support   
  
  
  
  
Mauricio Thao MA, LMHP-R

## 2021-04-06 NOTE — BSMART NOTE
ART THERAPY GROUP PROGRESS NOTE PATIENT SCHEDULED FOR GROUP AT: 10:00 
 
ATTENDANCE: 1/4 PARTICIPATION LEVEL:  Participates minimally in the art process ATTENTION LEVEL :Needs occasional re-direction FOCUS: Grounding SYMBOLIC & THEMATIC CONTENT AS NOTED IN IMAGERY: he initially was calm, compliant, and invested in the task at hand. He kept to himself unless directly prompted and his approach was planned-out. He had difficulty maintaining focus on the task at hand and left without warning after about 1/4 of group. He did not return.

## 2021-04-06 NOTE — GROUP NOTE
IP  GROUP DOCUMENTATION INDIVIDUAL Group Therapy Note Date: 4/5/2021 Group Start Time: 2000 Group End Time: 2015 Group Topic: Medication [facility] 1 ADULT CHEM DEP Boo Harley RN 
 
[facility] GROUP DOCUMENTATION GROUP Group Therapy Note Attendees: 8 Attendance: Attended Patient's Goal:  Educate Interventions/techniques: Reinforced Follows Directions: Followed directions Interactions: Did not interact appropriately Mental Status: Anxious Behavior/appearance: Agitated and Pressured speech Goals Achieved: Identified feelings Hunter Antunez RN

## 2021-04-07 PROCEDURE — 99232 SBSQ HOSP IP/OBS MODERATE 35: CPT | Performed by: PSYCHIATRY & NEUROLOGY

## 2021-04-07 PROCEDURE — 74011250637 HC RX REV CODE- 250/637: Performed by: PSYCHIATRY & NEUROLOGY

## 2021-04-07 PROCEDURE — 65220000003 HC RM SEMIPRIVATE PSYCH

## 2021-04-07 RX ADMIN — VENLAFAXINE HYDROCHLORIDE 150 MG: 150 CAPSULE, EXTENDED RELEASE ORAL at 08:26

## 2021-04-07 RX ADMIN — PREGABALIN 150 MG: 75 CAPSULE ORAL at 20:13

## 2021-04-07 RX ADMIN — CARISOPRODOL 350 MG: 350 TABLET ORAL at 20:13

## 2021-04-07 RX ADMIN — CARISOPRODOL 350 MG: 350 TABLET ORAL at 13:52

## 2021-04-07 RX ADMIN — PREGABALIN 150 MG: 75 CAPSULE ORAL at 08:26

## 2021-04-07 RX ADMIN — AMLODIPINE BESYLATE 5 MG: 5 TABLET ORAL at 08:26

## 2021-04-07 RX ADMIN — CARISOPRODOL 350 MG: 350 TABLET ORAL at 08:27

## 2021-04-07 RX ADMIN — MIRTAZAPINE 15 MG: 15 TABLET, FILM COATED ORAL at 20:13

## 2021-04-07 RX ADMIN — QUETIAPINE FUMARATE 100 MG: 100 TABLET ORAL at 20:12

## 2021-04-07 RX ADMIN — ROPINIROLE HYDROCHLORIDE 0.5 MG: 0.25 TABLET, FILM COATED ORAL at 20:13

## 2021-04-07 NOTE — PROGRESS NOTES
[address] [address]  Inpatient Progress Note     Date of Service: 04/07/21  Hospital Day: 5     Subjective/Interval History   04/07/21    Treatment Team Notes:  Notes reviewed and/or discussed and report that Jessica Kelley is a patient with a history of mood disorder (depression), and polysubstance use disorder present during our treatment team today. The patient participated appropriately. Patient interview: Jessica Kelley was interviewed by this writer today. No evidence of withdrawal symptoms present during this session today, however the patient remains profoundly depressed, describing that he has lost everything when his car was impounded, as he had all of his belongings in the car. He continues to describe his helplessness and hopelessness is rather severe with recurrent suicidal thoughts however is still able to maintain self-control while he is in the facility. He remains questioning his ability to do so if he is discharged from the hospital.  During session today the possibility of a referral to an outpatient program to help with building his his skills was brought up during the session. The patient agrees to participate in any type of referral that could be helpful to him. On the positive side he did denied any psychotic symptoms. In addition his group therapy participation has also improved. Objective     Visit Vitals  /87   Pulse 95   Temp 97.3 °F (36.3 °C)   Resp 18   Ht 5' 10\" (1.778 m)   Wt 81.6 kg (180 lb)   SpO2 98%   BMI 25.83 kg/m²     Vitals are stable    No results found for this or any previous visit (from the past 24 hour(s)). Mental Status Examination     Appearance/Hygiene 44 y.o. WHITE male  Hygiene: Poor   Behavior/Social Relatedness Appropriate, still very anxious.    Musculoskeletal Gait/Station: appropriate  Tone (flaccid, cogwheeling, spastic): not assessed  Psychomotor (hyperkinetic, hypokinetic): calm   Involuntary movements (tics, dyskinesias, akathisa, stereotypies): none   Speech   Rate, rhythm, volume, fluency and articulation are appropriate   Mood   depressed   Affect    anxious, labile   Thought Process Linear and goal directed   Thought Content and Perceptual Disturbances  suicidal thoughts are present however the patient is able to contract for safety while in the hospital.  He is currently denying any psychotic symptoms however his depression remains rather severe. Helpless hopeless, anhedonic with a very low self-esteem being noted. Sensorium and Cognition  Grossly intact   Insight  fair   Judgment  improving        Assessment/Plan      Psychiatric Diagnoses:   Patient Active Problem List   Diagnosis Code    Blood in stool K92.1    Knee pain M25.569    Knee pain M25.569    GERD (gastroesophageal reflux disease) K21.9    Panic attacks F41.0    Rectal bleeding K62.5    Cervicalgia M54.2    Lower back pain M54.5    Lipoma of back D17.1    Back pain of thoracolumbar region M54.5, M54.6    Muscle spasm of back M62.830    MDD (major depressive disorder), recurrent severe, without psychosis ([facility] [address] F33.2       Medical Diagnoses: Same    Psychosocial and contextual factors: Same    Level of impairment/disability: Severe     1. The patient is currently tolerating his prescription for Effexor and Remeron very well, however due to concerns about his blood pressure during the last several days, we have not been able to increase Effexor dose. Today his blood pressure was 137/87 and so we will proceed to increase his Effexor XR dose to 225 mg every morning. Remeron will be maintained the same. 2.  Reviewed instructions, risks, benefits and side effects of medications  3. Disposition/Discharge Date: self-care/to be determined.     Karthikeyan Durán MD, [address] [place] [address]  Psychiatry

## 2021-04-07 NOTE — GROUP NOTE
IP  GROUP DOCUMENTATION INDIVIDUAL Group Therapy Note Date: 4/7/2021 Group Start Time: 4128 Group End Time: 4299 Group Topic: Nursing [facility] 1 ADULT CHEM DEP MELISSA Shultz  GROUP DOCUMENTATION GROUP Group Therapy Note Attendees: 5 Attendance: Did not attend Nayana Orellana RN

## 2021-04-07 NOTE — PROGRESS NOTES
Problem: Falls - Risk of  Goal: *Absence of Falls  Description: Document Michelle Villanueva Fall Risk and appropriate interventions in the flowsheet. Outcome: Progressing Towards Goal  Note: Fall Risk Interventions:            Medication Interventions: Teach patient to arise slowly                   Problem: Suicide  Goal: *STG: Remains safe in hospital  Description: Pt will remain safe in hospital daily. Outcome: Progressing Towards Goal  Goal: *STG: Attends activities and groups  Outcome: Progressing Towards Goal     Pt presents with dull affect, depressed mood, anxious at times. Pt has been withdrawn to self on the unit, but did come to day area for select groups and during mealtime. Pt is participative in groups and is adherent with unit guidelines. Pt denies SI/HI at this time. Pt participated in interdisciplinary care team this morning. Pt states, \"I just feel so depressed. Before I came here was the worst I have ever felt in my life. I was staying in my car, and that got towed. That really upset me. I also need to get my benefits right too. \" Pt is medication compliant. Will continue to monitor.

## 2021-04-07 NOTE — BSMART NOTE
ART THERAPY GROUP PROGRESS NOTE Group time:1220 The patient declined to participate, however did join the group the last 10 minutes and actively listened during group discussion. He claimed that he was [place] a bad day\" and appeared dysphoric.

## 2021-04-07 NOTE — BSMART NOTE
Social Work Group 4/7/21 Positive Affirmation Group Attendance  Attended Number of participants 5 Time in 2:00 Time out 2:35 Total Time 35 Interaction Participated Interventions/ 
techniques Informed and encouraged, Provided positive feedback and support Verner Butts MA, LMHP-R

## 2021-04-07 NOTE — BSMART NOTE
History:  The patient whom described himself as being helpless and hopeless was considering taking an overdose with multiple medications which were provided to him at the time in which he was released from custodial around 2 days or so ago.  The patient who has had severe stressors, as I have above-mentioned, decided, however, to come to the emergency room at which time due to the severity of his depression and his potential for self-harm, he was offered with inpatient psychiatric care to which he accepted and so the basis for this admission. SW made contact with patient as he engaged in group activity with peers. Patient continues to reports feelings of depression and sadness. Patient reports a recent relapse associated with stressors which are currently controlling his life. Patient reports he was in custodial and once released his car was towed. He reports he was living out of his vehicle and he no longer has his vehicle. He reports his family showed him tough love and no longer support him and his drug habit. Plan: SW addressed treatment options. SW submitted application to [facility] in [facility] as well as [facility] for long term treatment. SW will continue to monitor patient and will assist with discharge as needed.  
 
Lucinda Neal

## 2021-04-07 NOTE — GROUP NOTE
BRENT  GROUP DOCUMENTATION INDIVIDUAL Group Therapy Note Date: 4/7/2021 Group Start Time: 3942 Group End Time: 0830 Group Topic: Nursing [facility] 1 ADULT CHEM RODRI KENNEDY  GROUP DOCUMENTATION GROUP Group Therapy Note Attendees: 5 Attendance: Attended Patient's Goal:  Unit Guidelines Interventions/techniques: Validated Follows Directions: Followed directions Interactions: Interacted appropriately Mental Status: Calm Behavior/appearance: Cooperative Goals Achieved: Able to give feedback to another Aimee Miner

## 2021-04-08 PROCEDURE — 65220000003 HC RM SEMIPRIVATE PSYCH

## 2021-04-08 PROCEDURE — 99232 SBSQ HOSP IP/OBS MODERATE 35: CPT | Performed by: PSYCHIATRY & NEUROLOGY

## 2021-04-08 PROCEDURE — 74011250637 HC RX REV CODE- 250/637: Performed by: PSYCHIATRY & NEUROLOGY

## 2021-04-08 RX ADMIN — QUETIAPINE FUMARATE 100 MG: 100 TABLET ORAL at 20:17

## 2021-04-08 RX ADMIN — VENLAFAXINE HYDROCHLORIDE 225 MG: 150 CAPSULE, EXTENDED RELEASE ORAL at 08:13

## 2021-04-08 RX ADMIN — CARISOPRODOL 350 MG: 350 TABLET ORAL at 20:17

## 2021-04-08 RX ADMIN — CARISOPRODOL 350 MG: 350 TABLET ORAL at 13:32

## 2021-04-08 RX ADMIN — CARISOPRODOL 350 MG: 350 TABLET ORAL at 08:15

## 2021-04-08 RX ADMIN — MIRTAZAPINE 15 MG: 15 TABLET, FILM COATED ORAL at 20:17

## 2021-04-08 RX ADMIN — PREGABALIN 150 MG: 75 CAPSULE ORAL at 08:13

## 2021-04-08 RX ADMIN — AMLODIPINE BESYLATE 5 MG: 5 TABLET ORAL at 08:13

## 2021-04-08 RX ADMIN — PREGABALIN 150 MG: 75 CAPSULE ORAL at 20:17

## 2021-04-08 RX ADMIN — ROPINIROLE HYDROCHLORIDE 0.5 MG: 0.25 TABLET, FILM COATED ORAL at 20:17

## 2021-04-08 NOTE — BSMART NOTE
Social Work Group 4/8/21 Positive Affirmation and Relaxation Group Attendance  Declined Number of participants 3 Time in 2:00 Time out 2:35 Total Time 35 Interaction Did not participate Interventions/ 
techniques Informed and encouraged   
  
  
  
  
Johnny Baldwin MA, LMHP-R

## 2021-04-08 NOTE — PROGRESS NOTES
[address] [facility] [address]  Inpatient Progress Note     Date of Service: 04/08/21  Hospital Day: 6     Subjective/Interval History   04/08/21    Treatment Team Notes:  Notes reviewed and/or discussed and report that Jayla Claudio is a patient with a history of a depressive disorder and polysubstance use disorder showing mild improvement. Attention is invited to the dictated admission note which is self-explanatory. Patient interview: Jayla Claudio was interviewed by this writer today. The patient described feeling as depressed today as he was yesterday. It did not help with the fact that one of his roommates was very loud, this allowing him not to be able to sleep last night. So he was found to be irritable and somewhat labile today. But is still treatment compliant. Of concern is that he did not go to his group therapy session this morning. He was advised that regardless of how tired he feels he needs to participate in treatment and promises to do so. Otherwise suicidal thoughts are doing better. They are less intense and less frequent. Medications tolerance is appreciated. Effexor XR dose increased this morning. We hope to observe positive treatment response. Objective     Visit Vitals  BP (!) 157/106   Pulse 80   Temp 98 °F (36.7 °C)   Resp 18   Ht 5' 10\" (1.778 m)   Wt 81.6 kg (180 lb)   SpO2 97%   BMI 25.83 kg/m²     Blood pressure was higher today. This is possibly associated to the fact that he was somewhat agitated and not able to sleep last night. However will observe. No results found for this or any previous visit (from the past 24 hour(s)). Mental Status Examination     Appearance/Hygiene 44 y.o.  WHITE male  Hygiene: Limited   Behavior/Social Relatedness  withdrawn   Musculoskeletal Gait/Station: appropriate  Tone (flaccid, cogwheeling, spastic): not assessed  Psychomotor (hyperkinetic, hypokinetic): calm   Involuntary movements (tics, dyskinesias, akathisa, stereotypies): none   Speech   Rate, rhythm, volume, fluency and articulation are appropriate   Mood   depressed   Affect    irritable   Thought Process Linear and goal directed   Thought Content and Perceptual Disturbances  suicidal thoughts are in general less frequent however they appear worse than yesterday. This possibly associated to his not sleeping and being angry at his roommate, with his continuing to show difficulties expressing self. Denies auditory and visual hallucinations   Sensorium and Cognition  Grossly intact   Insight  moderate   Judgment  fair        Assessment/Plan      Psychiatric Diagnoses:   Patient Active Problem List   Diagnosis Code    Blood in stool K92.1    Knee pain M25.569    Knee pain M25.569    GERD (gastroesophageal reflux disease) K21.9    Panic attacks F41.0    Rectal bleeding K62.5    Cervicalgia M54.2    Lower back pain M54.5    Lipoma of back D17.1    Back pain of thoracolumbar region M54.5, M54.6    Muscle spasm of back M62.830    MDD (major depressive disorder), recurrent severe, without psychosis ([facility] [address] F33.2       Medical Diagnoses: Same    Psychosocial and contextual factors: Same    Level of impairment/disability: Moderately severe    1. We will continue the same treatment. Will observe for any elevation of his blood pressure specifically to determine if there is any association with the Effexor XR dose increase and the blood pressure elevation. Asking staff to see if equal to a different room hoping this will help to improve his a sleeping patterns. 2.  Reviewed instructions, risks, benefits and side effects of medications  3. Disposition/Discharge Date: self-care/home, to be determined.     Jay Oakley MD. [address] [place] [address]  Psychiatry

## 2021-04-08 NOTE — BH NOTES
Group Therapy Note    Patient: Victoria Contreras    Patient # in Group: 7    Group Type: Coping Skills Group    Group Time: 30 minutes    Method used: Free discussion    Attendance: Victoria Contreras was      compliant with group and participated minimally for 50-99% of the duration. Interaction Narrative: Victoria Contreras had a Depressed and Irritable mood, was Cooperative during group and Dalton Tom's thought content was Negativistic. Writer Reinforced patients understanding of coping skills, how to engage in using them, when is the appropriate timing, and when to seek professional help. Patient was Able to listen to others, Able to give feedback to another and Able to receive feedback. Will continue to monitor and provide redirection, education, and support as needed.

## 2021-04-08 NOTE — BSMART NOTE
History:  The patient whom described himself as being helpless and hopeless was considering taking an overdose with multiple medications which were provided to him at the time in which he was released from intermediate around 2 days or so ago.  The patient who has had severe stressors, as I have above-mentioned, decided, however, to come to the emergency room at which time due to the severity of his depression and his potential for self-harm, he was offered with inpatient psychiatric care to which he accepted and so the basis for this admission. SW made contact with patient as he remained in bed. Patient continues to report feelings of sadness and depression. He reports treating psychiatrist has made changes to his medications. He reports he continues to have trouble sleeping and has faint thoughts of suicide. SW informed patient he has been accepted io Crisis Stabilization in [facility] and will transfer upon discharge. SW encouraged patient to attend psycho education and engage in group to address feelings of sadness and hopelessness. SW will continue with supportive counseling and will assist with discharge as needed.  
 
Ravin Section, LCSW-E

## 2021-04-08 NOTE — BH NOTES
Patient spend most of the evening in his room. Patient however did come out for group but did not participate and returned to his room shortly thereafter. Patient did have dinner and ate all of it. Patient did not interact with peers while in the milieu. Staff will continue to monitor patient for safety.

## 2021-04-08 NOTE — PROGRESS NOTES
Problem: Crack/Cocaine Withdrawal  Goal: *STG: Will identify negative impact of chemical dependency including the use of tobacco, alcohol, and other substances  Outcome: Progressing Towards Goal  Goal: *STG: Agrees to participate in outpatient after care program to support ongoing mental health  Outcome: Progressing Towards Goal  Goal: *STG: Able to indentify relapse triggers including interpersonal/social and familial factors  Outcome: Progressing Towards [address] [facility] Mortimer Fredrickson is a 44 y.o. Male that presented today as depressed and anxious, but cooperative with staff. Hermelinda Quintero has been compliant with his medications, has eaten all of his meals offered, and has been attending some groups. RN's will initiate, develop, implement, review, and revise treatment plans as necessary. Will continue to provide education, redirection, and support as needed or verbalized by patient.    Signed By: Garrison Joseph RN     April 8, 2021

## 2021-04-08 NOTE — BSMART NOTE
OCCUPATIONAL THERAPY PROGRESS NOTE Group Time:  9053 Attendance: The patient attended full group. Participation:. The patient participated with minimal elaboration in the activity. Attention: The patient was able to focus on the activity. Interaction: The patient acknowledges others or responds to questions,  with no spontaneous interaction.  
Participated as asked, somewhat superficial.

## 2021-04-08 NOTE — BH NOTES
Med Note   Patient approached writer at nurses station with complaints of muscle spasms/pain in lower back. Patient was given soma 350mg, per doctor's orders, and took without incident. Will continue to monitor for effectiveness and provide support as needed.

## 2021-04-09 PROCEDURE — 99232 SBSQ HOSP IP/OBS MODERATE 35: CPT | Performed by: PSYCHIATRY & NEUROLOGY

## 2021-04-09 PROCEDURE — 65220000003 HC RM SEMIPRIVATE PSYCH

## 2021-04-09 PROCEDURE — 74011250637 HC RX REV CODE- 250/637: Performed by: PSYCHIATRY & NEUROLOGY

## 2021-04-09 RX ORDER — HYDROXYZINE PAMOATE 50 MG/1
100 CAPSULE ORAL
Status: DISCONTINUED | OUTPATIENT
Start: 2021-04-09 | End: 2021-04-12 | Stop reason: HOSPADM

## 2021-04-09 RX ADMIN — PREGABALIN 150 MG: 75 CAPSULE ORAL at 08:34

## 2021-04-09 RX ADMIN — CARISOPRODOL 350 MG: 350 TABLET ORAL at 08:33

## 2021-04-09 RX ADMIN — VENLAFAXINE HYDROCHLORIDE 225 MG: 150 CAPSULE, EXTENDED RELEASE ORAL at 08:34

## 2021-04-09 RX ADMIN — ROPINIROLE HYDROCHLORIDE 0.5 MG: 0.25 TABLET, FILM COATED ORAL at 20:28

## 2021-04-09 RX ADMIN — PREGABALIN 150 MG: 75 CAPSULE ORAL at 20:29

## 2021-04-09 RX ADMIN — CARISOPRODOL 350 MG: 350 TABLET ORAL at 20:28

## 2021-04-09 RX ADMIN — CARISOPRODOL 350 MG: 350 TABLET ORAL at 15:25

## 2021-04-09 RX ADMIN — AMLODIPINE BESYLATE 5 MG: 5 TABLET ORAL at 08:34

## 2021-04-09 RX ADMIN — QUETIAPINE FUMARATE 100 MG: 100 TABLET ORAL at 20:28

## 2021-04-09 RX ADMIN — MIRTAZAPINE 15 MG: 15 TABLET, FILM COATED ORAL at 20:28

## 2021-04-09 NOTE — BSMART NOTE
OCCUPATIONAL THERAPY PROGRESS NOTE Group Time:  0817 Attendance: The patient attended full group. Participation: The patient participated with moderate elaboration in the activity. Attention: The patient was able to focus on the activity. Interaction:. The patient acknowledges others or responds to questions,  with no spontaneous interaction. Participated in discussion on stress management. Seems largely unaware of stressors.

## 2021-04-09 NOTE — PROGRESS NOTES
[address] [facility] [address]  Inpatient Progress Note     Date of Service: 04/09/21  Hospital Day: 7     Subjective/Interval History   04/09/21    Treatment Team Notes:  Notes reviewed and/or discussed and report that Jose Reeves is a patient with a history of major depressive disorder recurrent and polysubstance use disorder, describing becoming very anxious and irritable today this being secondary to the problems that he is having with a roommate who has led him not to be able to sleep at night. Patient interview: Jose Reeves was interviewed by this writer today. Angry and irritable, helpless and hopeless, steps are being taking to move the patient to a different room when the bed becomes available. However and request for us to be able to prescribe a benzodiazepine for him due to how anxious he feels was denied due to his history of polysubstance use disorder. He did describe that the current dose of Vistaril is not working and so we will switch it from 50 to 100 mg every 4-6 hours as needed. Dysfunction follow-up also regarding the possibility of his being discharge to the crisis stabilization unit by Monday. The patient is very much in agreement to do so will referral to the [address] [place] [address] in  [place] happening after that. Objective     Visit Vitals  /80 (BP 1 Location: Right arm, BP Patient Position: Sitting)   Pulse 88   Temp 97.6 °F (36.4 °C)   Resp 20   Ht 5' 10\" (1.778 m)   Wt 81.6 kg (180 lb)   SpO2 97%   BMI 25.83 kg/m²     Vitals are stable    No results found for this or any previous visit (from the past 24 hour(s)). Mental Status Examination     Appearance/Hygiene 44 y.o.  WHITE male  Hygiene: Limited   Behavior/Social Relatedness Appropriate   Musculoskeletal Gait/Station: appropriate  Tone (flaccid, cogwheeling, spastic): not assessed  Psychomotor (hyperkinetic, hypokinetic): calm   Involuntary movements (tics, dyskinesias, akathisa, stereotypies): none   Speech   Rate, rhythm, volume, fluency and articulation are appropriate   Mood   depressed   Affect    irritable, easily agitated   Thought Process Linear and goal directed   Thought Content and Perceptual Disturbances Denies self-injurious behavior (SIB), suicidal ideation (SI), aggressive behavior or homicidal ideation (HI), however potential for control loss remains. Agrees to talk to her staff if unable to control self. Denies auditory and visual hallucinations   Sensorium and Cognition  Grossly intact   Insight  improving   Judgment  improving        Assessment/Plan      Psychiatric Diagnoses:   Patient Active Problem List   Diagnosis Code    Blood in stool K92.1    Knee pain M25.569    Knee pain M25.569    GERD (gastroesophageal reflux disease) K21.9    Panic attacks F41.0    Rectal bleeding K62.5    Cervicalgia M54.2    Lower back pain M54.5    Lipoma of back D17.1    Back pain of thoracolumbar region M54.5, M54.6    Muscle spasm of back M62.830    MDD (major depressive disorder), recurrent severe, without psychosis ([facility] [address] F33.2       Medical Diagnoses: Same    Psychosocial and contextual factors: Same    Level of impairment/disability: Moderately severe      1. Effexor XR dose has been recently increased. Vistaril dose will be increased as indicated above. We will maintain mirtazapine dose the same. 2.  Reviewed instructions, risks, benefits and side effects of medications  3. Disposition/Discharge Date: self-care/crisis stabilization unit on Monday.     Kashmir Tomlinson MD, [address] [place] [address]  Psychiatry

## 2021-04-09 NOTE — BSMART NOTE
PT IS NOW AGREEABLE WITH [facility]URE IS SCHEDULE FOR 0830 TOMORROW MORNING.
CM NOTIFIED ANANTH LIAISON WITH [facility]. CM TO FOLLOW AS
INDICATED WITH DC PLANNING. Social Work Group 4/7/21 Mindfulness /Wellness and Relaxation Group Attendance  Did not participate Number of participants 3 Time in 2:00 Time out 2:50 Total Time 50 Interaction Declined Interventions/ 
techniques Informed and encouraged  
  
  
  
  
Johnny Baldwin MA, LMHP-R

## 2021-04-09 NOTE — BH NOTES
Patient given Lashae Byrd for muscle spasms per patient request will continue to follow current POC and interventions per policies/protcols.

## 2021-04-09 NOTE — BH NOTES
On 4/8/21 during the 3-11 pm shift patient is polite and manner-able. Patient attended all groups this shift and participated. Patient ate all his dinner with no complaint. During this shift patient placed a few phone calls. Patient watched TV during this shift and had snacks during snack time. This writer witnessed no behavorial issues from patient during this shift. This writer will continue to monitor patient for safety.

## 2021-04-09 NOTE — BSMART NOTE
History:  The patient whom described himself as being helpless and hopeless was considering taking an overdose with multiple medications which were provided to him at the time in which he was released from residential around 2 days or so ago.  The patient who has had severe stressors, as I have above-mentioned, decided, however, to come to the emergency room at which time due to the severity of his depression and his potential for self-harm, he was offered with inpatient psychiatric care to which he accepted and so the basis for this admission. SW made contact with patient as he remains withdrawn to the unit. Patient affect remains flat and sad. Patient reports medication changes scheduled for this evening. Patient continues to report feelings of sadness and depression. SW addressed discharge plan which includes admission in Crisis Stabilization. SW will continue with supportive counseling and will assist with discharge.  
 
Pito Gale, MATT-E

## 2021-04-09 NOTE — PROGRESS NOTES
Comprehensive Nutrition Assessment    Type and Reason for Visit: Initial, RD nutrition re-screen/LOS    Nutrition Recommendations/Plan:  - Continue current regular diet    Nutrition Assessment:  Good appetite and po intake. Nursing reporting pt only coming out of room for meals. Tolerating diet. Malnutrition Assessment:  Malnutrition Status:  No malnutrition      Nutrition History and Allergies: PMHx- MDD, polysubstance use disorder. Reports good appetite and meal intake PTA with the exception of the first 5 days he was in retirement.  Denies changes in weight hx with UBW of 180# reported and feels like he weighs about 200# recently. NKFA. Estimated Daily Nutrient Needs:  Energy (kcal): 2012-2179; Weight Used for Energy Requirements: Current(82 kg)  Protein (g): 66-82; Weight Used for Protein Requirements: Current(0.8-1)  Fluid (ml/day): 2012-2179; Method Used for Fluid Requirements: 1 ml/kcal    Nutrition Related Findings:  remeron      Wounds:    None       Current Nutrition Therapies:  DIET REGULAR    Anthropometric Measures:  · Height:  5' 10\" (177.8 cm)  · Current Body Wt:  81.6 kg (179 lb 14.3 oz)   · Admission Body Wt:  179 lb 14.3 oz    · Usual Body Wt:  81.6 kg (180 lb)     · Ideal Body Wt:  166 lbs:  108.4 %   · BMI Category: Overweight (BMI 25.0-29. 9)       Nutrition Diagnosis:   No nutrition diagnosis at this time    Nutrition Interventions:   Food and/or Nutrient Delivery: Continue current diet  Nutrition Education and Counseling: Education not indicated  Coordination of Nutrition Care: Continue to monitor while inpatient    Goals:  PO nutrition intake will continue to meet >75% of patients estimated nutritional needs over the next 7 days.        Nutrition Monitoring and Evaluation:   Behavioral-Environmental Outcomes: None identified  Food/Nutrient Intake Outcomes: Food and nutrient intake  Physical Signs/Symptoms Outcomes: Meal time behavior, Nutrition focused physical findings    Discharge Planning: No discharge needs at this time     Electronically signed by Dominga Hensley RD on 4/9/2021 at 2:32 PM    Contact: [phone]

## 2021-04-09 NOTE — BH NOTES
The patient was up earlier in the milieu  . He ate dinner and withdrew to his room going  back to bed. He denies thoughts of self harm. He denies that he is having thoughts  Of harming others. Will continue to monitor and will continue to provide support.

## 2021-04-09 NOTE — PROGRESS NOTES
Problem: Falls - Risk of  Goal: *Absence of Falls  Description: Document Chetna Macias Fall Risk and appropriate interventions in the flowsheet. Outcome: Progressing Towards Goal  Note: Fall Risk Interventions:            Medication Interventions: Teach patient to arise slowly                   Problem: Suicide  Goal: *STG: Remains safe in hospital  Description: Pt will remain safe in hospital daily. Outcome: Progressing Towards Goal  Goal: *STG: Seeks staff when feelings of self harm or harm towards others arise  Description: Pt to seek staff when feelings of self harm or harm towards others arise. Outcome: Progressing Towards Goal     Pt presents with dull affect, depressed mood, preoccupied. Pt has been withdrawn to self on the unit, only coming to day area during mealtime. Pt denies SI/HI at this time. Pt is medication compliant, and received PRN Soma during this shift. Will continue to monitor.

## 2021-04-09 NOTE — BSMART NOTE
ART THERAPY GROUP PROGRESS NOTE Group time: 930 The patient did not awaken/get up when called for group. Art Therapy Intern administered group art therapy.

## 2021-04-10 PROCEDURE — 99233 SBSQ HOSP IP/OBS HIGH 50: CPT | Performed by: PSYCHIATRY & NEUROLOGY

## 2021-04-10 PROCEDURE — 74011250637 HC RX REV CODE- 250/637: Performed by: PSYCHIATRY & NEUROLOGY

## 2021-04-10 PROCEDURE — 65220000003 HC RM SEMIPRIVATE PSYCH

## 2021-04-10 RX ADMIN — PREGABALIN 150 MG: 75 CAPSULE ORAL at 20:17

## 2021-04-10 RX ADMIN — CARISOPRODOL 350 MG: 350 TABLET ORAL at 08:36

## 2021-04-10 RX ADMIN — VENLAFAXINE HYDROCHLORIDE 225 MG: 150 CAPSULE, EXTENDED RELEASE ORAL at 08:36

## 2021-04-10 RX ADMIN — PREGABALIN 150 MG: 75 CAPSULE ORAL at 08:36

## 2021-04-10 RX ADMIN — AMLODIPINE BESYLATE 5 MG: 5 TABLET ORAL at 08:36

## 2021-04-10 RX ADMIN — MIRTAZAPINE 15 MG: 15 TABLET, FILM COATED ORAL at 20:18

## 2021-04-10 RX ADMIN — ROPINIROLE HYDROCHLORIDE 0.5 MG: 0.25 TABLET, FILM COATED ORAL at 20:17

## 2021-04-10 RX ADMIN — CARISOPRODOL 350 MG: 350 TABLET ORAL at 20:17

## 2021-04-10 RX ADMIN — QUETIAPINE FUMARATE 100 MG: 100 TABLET ORAL at 20:18

## 2021-04-10 RX ADMIN — CARISOPRODOL 350 MG: 350 TABLET ORAL at 14:41

## 2021-04-10 NOTE — BH NOTES
Patient given Thejunie Guerrero for back muscle spasms per patient request will continue to follow current POC and interventions per policies/protcols.

## 2021-04-10 NOTE — PROGRESS NOTES
[address]  Inpatient Progress Note     Date of Service: 04/10/21  Hospital Day: 8     Subjective/Interval History   04/10/21    Treatment Team Notes:  Notes reviewed and/or discussed and report that Aubrey Escobedo is a patient with a history of major depressive disorder recurrent and polysubstance use disorder. Patient interview: Aubrey Escobedo was interviewed by this writer today. Patient reports feeling \"much better\" although he is anxious about what will happen after discharge. Feels medications are helping. Objective     Visit Vitals  /84 (BP 1 Location: Left upper arm, BP Patient Position: At rest)   Pulse 96   Temp 97.4 °F (36.3 °C)   Resp 20   Ht 5' 10\" (1.778 m)   Wt 81.6 kg (180 lb)   SpO2 97%   BMI 25.83 kg/m²       No results found for this or any previous visit (from the past 24 hour(s)). Mental Status Examination     Appearance/Hygiene 44 y.o. WHITE male  Hygiene:  In hospital scrubs   Behavior/Social Relatedness Appropriate   Musculoskeletal Gait/Station: appropriate  Tone (flaccid, cogwheeling, spastic): not assessed  Psychomotor (hyperkinetic, hypokinetic): calm   Involuntary movements (tics, dyskinesias, akathisa, stereotypies): none   Speech   Rate, rhythm, volume, fluency and articulation are appropriate   Mood   Better   Affect    Blunted   Thought Process Linear and goal directed   Thought Content and Perceptual Disturbances Denies self-injurious behavior (SIB), suicidal ideation (SI), aggressive behavior or homicidal ideation (HI)    Denies auditory and visual hallucinations   Sensorium and Cognition  Grossly intact   Insight  poor   Judgment poor        Assessment/Plan      Psychiatric Diagnoses:   Patient Active Problem List   Diagnosis Code    Blood in stool K92.1    Knee pain M25.569    Knee pain M25.569    GERD (gastroesophageal reflux disease) K21.9    Panic attacks F41.0    Rectal bleeding K62.5    Cervicalgia M54.2    Lower back pain M54.5    Lipoma of back D17.1    Back pain of thoracolumbar region M54.5, M54.6    Muscle spasm of back M62.830    MDD (major depressive disorder), recurrent severe, without psychosis ([facility] [address] F33.2       Psychosocial and contextual factors: Unchanged     Level of impairment/disability: Allie De Luna is a 44 y.o. who is currently managed for depression with SI.      1.  Continue medications unchanged given mild improvement in symptoms without side effects.   2.  Disposition/Discharge Date: LOIS Metz [facility]  [facility]

## 2021-04-10 NOTE — GROUP NOTE
[facility] GROUP DOCUMENTATION INDIVIDUAL Group Therapy Note Date: 4/10/2021 Group Start Time: 6408 Group End Time: 0039 Group Topic: Nursing [facility] ADULT CHEM DEP Zee Dinero., RN 
 
[facility] GROUP DOCUMENTATION GROUP Group Therapy Note: Importance of \"finding the Good in everyday life\" Attendees: 5 Attendance: Attended Patient's Goal:  Practice  Being more positive, not just seeing each situation as a negative. Interventions/techniques: Informed and Supported Follows Directions: Followed directions Interactions: Interacted appropriately Mental Status: Anxious and Congruent Behavior/appearance: Attentive and Cooperative Goals Achieved: Able to engage in interactions and Identified distorted thoughts/beliefs Additional Notes:  Patient encouraged to keep practicing flipping negative thinking into a more positive perspective. Marcella Simpson

## 2021-04-10 NOTE — BH NOTES
EARLE Note: The above pt has been alert and cooperative majority of the shift. He has appeared to be focused on getting help for his stressors which is placement upon discharge from the hospital. He has been compliant with medications this sift and has not been a management this shift. He has participated in all scheduled groups this shift. He appeared to be eager to be focused on his treatment when discharged from the hospital. He verbally contract for safety and denies any self harm at this time.

## 2021-04-11 PROCEDURE — 99233 SBSQ HOSP IP/OBS HIGH 50: CPT | Performed by: PSYCHIATRY & NEUROLOGY

## 2021-04-11 PROCEDURE — 65220000003 HC RM SEMIPRIVATE PSYCH

## 2021-04-11 PROCEDURE — 74011250637 HC RX REV CODE- 250/637: Performed by: PSYCHIATRY & NEUROLOGY

## 2021-04-11 RX ADMIN — PREGABALIN 150 MG: 75 CAPSULE ORAL at 20:09

## 2021-04-11 RX ADMIN — CARISOPRODOL 350 MG: 350 TABLET ORAL at 08:23

## 2021-04-11 RX ADMIN — CARISOPRODOL 350 MG: 350 TABLET ORAL at 20:08

## 2021-04-11 RX ADMIN — AMLODIPINE BESYLATE 5 MG: 5 TABLET ORAL at 08:23

## 2021-04-11 RX ADMIN — QUETIAPINE FUMARATE 100 MG: 100 TABLET ORAL at 20:09

## 2021-04-11 RX ADMIN — PREGABALIN 150 MG: 75 CAPSULE ORAL at 08:23

## 2021-04-11 RX ADMIN — VENLAFAXINE HYDROCHLORIDE 225 MG: 150 CAPSULE, EXTENDED RELEASE ORAL at 08:23

## 2021-04-11 RX ADMIN — CARISOPRODOL 350 MG: 350 TABLET ORAL at 14:20

## 2021-04-11 RX ADMIN — ROPINIROLE HYDROCHLORIDE 0.5 MG: 0.25 TABLET, FILM COATED ORAL at 20:09

## 2021-04-11 RX ADMIN — MIRTAZAPINE 15 MG: 15 TABLET, FILM COATED ORAL at 20:09

## 2021-04-11 NOTE — PROGRESS NOTES
Problem: Suicide  Goal: *STG: Remains safe in hospital  Description: Pt will remain safe in hospital daily. Outcome: Progressing Towards Goal  Goal: *STG: Seeks staff when feelings of self harm or harm towards others arise  Description: Pt to seek staff when feelings of self harm or harm towards others arise. Outcome: Progressing Towards Goal  Goal: *STG: Attends activities and groups  Outcome: Progressing Towards Goal     Problem: Crack/Cocaine Withdrawal  Goal: *STG: Complies with medication therapy  Outcome: Progressing Towards Goal   Pt spent majority of this evening in the day area watching tv and socializing with peers. Pt alert and oriented x3, and makes needs known. Pt attended and participated in group this evening, he is pleasant and  respectful. He took hs medication without any incident. Pt remains free of fall. He  denies SI/HI/AVH. Will continue too monitor pt per facility protocol.

## 2021-04-11 NOTE — PROGRESS NOTES
[address]  Inpatient Progress Note     Date of Service: 04/11/21  Hospital Day: 9     Subjective/Interval History   04/11/21    Treatment Team Notes:  Notes reviewed and/or discussed and report that Kezia Dick is a patient with a history of major depressive disorder recurrent and polysubstance use disorder. Patient interview: Kezia Dick was interviewed by this writer today. Patient reports improvement but also stated feeling tired today. Feels medication are helping. Objective     Visit Vitals  /87 (BP 1 Location: Left upper arm, BP Patient Position: At rest)   Pulse 89   Temp 97 °F (36.1 °C)   Resp 20   Ht 5' 10\" (1.778 m)   Wt 81.6 kg (180 lb)   SpO2 97%   BMI 25.83 kg/m²       No results found for this or any previous visit (from the past 24 hour(s)). Mental Status Examination     Appearance/Hygiene 44 y.o. WHITE male  Hygiene:  In hospital gown   Behavior/Social Relatedness Appropriate   Musculoskeletal Gait/Station: appropriate  Tone (flaccid, cogwheeling, spastic): not assessed  Psychomotor (hyperkinetic, hypokinetic): calm   Involuntary movements (tics, dyskinesias, akathisa, stereotypies): none   Speech   Rate, rhythm, volume, fluency and articulation are appropriate   Mood   Better   Affect    Blunted   Thought Process Linear and goal directed   Thought Content and Perceptual Disturbances Denies self-injurious behavior (SIB), suicidal ideation (SI), aggressive behavior or homicidal ideation (HI)    Denies auditory and visual hallucinations   Sensorium and Cognition  Grossly intact   Insight  poor   Judgment poor        Assessment/Plan      Psychiatric Diagnoses:   Patient Active Problem List   Diagnosis Code    Blood in stool K92.1    Knee pain M25.569    Knee pain M25.569    GERD (gastroesophageal reflux disease) K21.9    Panic attacks F41.0    Rectal bleeding K62.5    Cervicalgia M54.2    Lower back pain M54.5    Lipoma of back D17.1    Back pain of thoracolumbar region M54.5, M54.6    Muscle spasm of back M62.830    MDD (major depressive disorder), recurrent severe, without psychosis ([facility] [address] F33.2       Psychosocial and contextual factors: Unchanged    Level of impairment/disability: Allie De Luna is a 44 y.o. who is currently managed for depression with SI.      1.  Continue current medication regimen unchanged given improvement in symptoms.   2.  Disposition/Discharge Date: LOIS Metz MD DR. [facility]  Psychiatry

## 2021-04-11 NOTE — GROUP NOTE
BRENT  GROUP DOCUMENTATION INDIVIDUAL Group Therapy Note Date: 4/10/2021 Group Start Time: 2030 Group End Time: 2045 Group Topic: Medication SO CRESCENT BEH [facility] 1 SPECIAL TRT 1 Jordan Green RN IP BH GROUP DOCUMENTATION GROUP Group Therapy Note Attendees: 8 Attendance: Attended Patient's Goal: Medication information Interventions/techniques: Informed Follows Directions: Followed directions Interactions: Interacted appropriately Mental Status: Calm Behavior/appearance: Cooperative Goals Achieved: Able to listen to others Additional Notes:   
 
Chayo Magaña RN

## 2021-04-11 NOTE — PROGRESS NOTES
Problem: Suicide  Goal: *STG: Attends activities and groups  Outcome: Progressing Towards Goal as evidence by attending all groups and activities during this shift. Problem: Crack/Cocaine Withdrawal  Goal: *STG: Agrees to participate in outpatient after care program to support ongoing mental health  Outcome: Progressing Towards Goal as evidence by informing this nurse he is planning to attend extended substance abuse program.      Problem: Opiate Withdrawal  Goal: *STG: Agrees to participate in outpatient after care program to support ongoing mental health  Outcome: Progressing Towards Goal as evidence by informing this nurse of plans to attend extended substance abuse program upon discharge. Patient was able to speak with a male peer that has attended crisis stabilization program he \"is supposed to go to. \" patient voiced \"I feel much better about it\" when this nurse spoke with him again about anxiety. Peer was able (and willing) to answer questions and gave facility positive review to include sincerity of staff. Patient has eaten all meals and has been compliant with scheduled medications, received PRN Soma twice this shift. Patient has been compliant with unit guidelines, free from falls and harm. Will continue to monitor and provide encouragement and interventions as appropriate.

## 2021-04-12 VITALS
OXYGEN SATURATION: 97 % | WEIGHT: 180 LBS | DIASTOLIC BLOOD PRESSURE: 86 MMHG | HEIGHT: 70 IN | SYSTOLIC BLOOD PRESSURE: 134 MMHG | RESPIRATION RATE: 20 BRPM | TEMPERATURE: 97.3 F | HEART RATE: 102 BPM | BODY MASS INDEX: 25.77 KG/M2

## 2021-04-12 PROCEDURE — 99238 HOSP IP/OBS DSCHRG MGMT 30/<: CPT | Performed by: PSYCHIATRY & NEUROLOGY

## 2021-04-12 PROCEDURE — 74011250637 HC RX REV CODE- 250/637: Performed by: PSYCHIATRY & NEUROLOGY

## 2021-04-12 RX ORDER — ROPINIROLE 0.5 MG/1
0.5 TABLET, FILM COATED ORAL
Qty: 30 TAB | Refills: 0 | Status: SHIPPED | OUTPATIENT
Start: 2021-04-12

## 2021-04-12 RX ORDER — CARISOPRODOL 350 MG/1
350 TABLET ORAL
Qty: 60 TAB | Refills: 1 | Status: SHIPPED | OUTPATIENT
Start: 2021-04-12

## 2021-04-12 RX ORDER — VENLAFAXINE HYDROCHLORIDE 75 MG/1
225 CAPSULE, EXTENDED RELEASE ORAL
Qty: 90 CAP | Refills: 0 | Status: SHIPPED | OUTPATIENT
Start: 2021-04-13

## 2021-04-12 RX ORDER — AMLODIPINE BESYLATE 5 MG/1
5 TABLET ORAL DAILY
Qty: 30 TAB | Refills: 0 | Status: SHIPPED | OUTPATIENT
Start: 2021-04-13

## 2021-04-12 RX ORDER — MIRTAZAPINE 15 MG/1
15 TABLET, FILM COATED ORAL
Qty: 1 TAB | Refills: 0 | Status: SHIPPED
Start: 2021-04-12

## 2021-04-12 RX ORDER — QUETIAPINE FUMARATE 100 MG/1
100 TABLET, FILM COATED ORAL
Qty: 1 TAB | Refills: 0 | Status: SHIPPED
Start: 2021-04-12

## 2021-04-12 RX ADMIN — AMLODIPINE BESYLATE 5 MG: 5 TABLET ORAL at 08:38

## 2021-04-12 RX ADMIN — VENLAFAXINE HYDROCHLORIDE 225 MG: 150 CAPSULE, EXTENDED RELEASE ORAL at 08:38

## 2021-04-12 RX ADMIN — PREGABALIN 150 MG: 75 CAPSULE ORAL at 08:38

## 2021-04-12 RX ADMIN — CARISOPRODOL 350 MG: 350 TABLET ORAL at 08:38

## 2021-04-12 NOTE — BH NOTES
Pt received discharge instructions, prescriptions, and emergency numbers. Pt completed satisfaction survey. Pt completed suicide safety plan with staff. Pt refused influenza vaccine. All personal belongings returned to pt. Pt encouraged to follow-up with outpatient resources and to call with any questions or concerns.      Patient is admitted for services with Crisis Stabilization in [address],  [address] [phone]

## 2021-04-12 NOTE — BSMART NOTE
ART THERAPY GROUP PROGRESS NOTE Group time: 940 The patient declined group despite encouragement. Art Therapy Intern administered group art therapy.

## 2021-04-12 NOTE — BH NOTES
Patient spend the evening in his room only came out for meals and snack and returned to his room shortly thereafter. Staff will continue to monitor patient for safety.

## 2021-04-12 NOTE — DISCHARGE SUMMARY
[facility] DISCHARGE    Name:  Aman Venegas  MR#:   [id number]  :  1970  ACCOUNT #:  [de-identified]  ADMIT DATE:  2021  DISCHARGE DATE:  2021    SIGNIFICANT FINDINGS:  History and physical exam was performed shortly after the patient was admitted to the facility, attention is invited to this document, a place where the reasons for which the patient consulted with the hospital's emergency department, the finding upon his being examined there and so the basis for which he required psychiatric admission are very clearly stated. For the purpose of this discharge summary, the reader is advised that the patient has a history of multiple psychiatric hospitalizations, the last one in his current electronic medical records being an admission to a [facility] in 2019 for what appears to be a history of depression and opioid use disorder. However, the patient described having admissions to other facilities, specifically [facility] in addition to a facility in [place], [place]. His last admission was at [facility], a place where he met Dr. Varghese Miner, a psychiatrist in the [place] area, who also follows the patient as an outpatient at [facility]. The patient's depression was this time associated to his car being impounded, this being the result of his being apparently in custodial due to a history of failure to appear in [place], [place]. The patient was in custodial for 10 days; however, as a result of his being in custodial during this period of time, he lost the opportunity of being able to work in [address] [place] [address] for a woman who had hired him. Obviously, when he went to custodial, he lost this opportunity. In addition, he described when coming out of custodial being increasingly depressed because of his job loss but also to the fact that the car in which he had all of his belongings and where he was staying was impounded.   This got him to lose control, began to use heroin two or three capsules prior to admission per day, became suicidal and came to the facility for evaluation. Multiple labs were performed at the time of the patient's evaluation in the emergency department including a CBC with differential that showed mild elevation in hemoglobin to 16.4, hematocrit of 49.0, RBC to 6.80, otherwise basically normal.  Urinalysis negative. Blood chemistry showed normal electrolytes, normal kidney functioning tests and normal liver function tests. Urine drug screen was positive for cocaine and opiates. SARS-CoV-19 showed negative results from a nasopharyngeal testing. COURSE DURING HOSPITALIZATION AND TREATMENT:  Admitted to the adult CD program, the patient has been seen daily and has been referred to the groups within context of the program.  A rather depressed man who was continued on the same combination of medications that he was taking prior to admission with some changes being made during this hospitalization including increasing his Effexor XR dose from 150 in the morning to a current dose of 225 mg every morning, was nonetheless maintained on prescriptions for mirtazapine 15 mg at bedtime, Lyrica 150 mg twice a day, Requip 0.5 mg every night for his restless legs syndrome and also required prescription for Norvasc 5 mg a day due to his being noted to be hypertensive. The patient required continuation of Soma for the problems that he has had with chronic muscle spasms/pain, which he took appropriately. As treatment continued, the patient's depression began to improve, he was evaluated by the crisis stabilization program in [facility], which has accepted the patient with admission for today being planned, in addition to his waiting there for a call from the [facility] in [place], a telephone call that will come while he is in the crisis stabilization unit.   He hopes to be able to go there for a 28-day rehab program.  So, doing better, the patient's current mental status shows him not to be suicidal or homicidal, there is no evidence of psychosis and organicity, and he is currently stable to be able to be discharged with admission to the [place] [address]. While in the facility, [employer], nurse practitioner, did a physical exam on the patient, which showed basically negative findings with exception of opioid-related withdrawal symptoms. Since admission, an electrocardiogram was performed, which showed a normal sinus rhythm with a ventricular rate response of 93 beats per minute, , and QTc 425. FINAL DIAGNOSES:  AXIS I:  Major depressive disorder, recurrent, severe, without psychotic symptoms. Opioid use disorder, severe. Cocaine use disorder, severe. AXIS II:  Noncontributory. AXIS III:  Fibromyalgia with chronic history of pain. Restless legs syndrome. Gastroesophageal reflux disease by history. History of rectal bleeding secondary to internal hemorrhoids, remote. Status post excision of lipoma of upper back. Status post appendectomy. History of allergies to penicillin which produces hives and adverse reactions to gabapentin which produces hypotension and ibuprofen - GI symptoms. DISPOSITION:  The patient is being discharged with referral to the [place] crisis stabilization unit, waiting for a call from the [facility], a 28-day rehab program in [place]. After he finishes with his rehab program, he plans on coming back to this area to continue further outpatient treatment with Dr. Leighann Yoon with [address] [place] [address]. The patient is to consult with a primary care physician of his choice as needed for his medical problems.     PRESCRIPTIONS UPON DISCHARGE:  The patient will require the following prescriptions given for 30 days without refills, including prescriptions for Effexor XR 75 mg to take three every morning, Requip 0.5 mg to take one every night, Soma 350 mg tablets to take one four times a day. He has prescriptions in addition to Norvasc 5 mg tablets to take one every morning. He has prescription for Remeron 15 mg every night at bedtime, Lyrica 150 mg twice a day and Seroquel 100 mg every night at bedtime. PROGNOSIS:  At least fair with outpatient treatment compliance.       Janae Montoya MD, LFAPA      [id number]  D:  04/12/2021 11:52  T:  04/12/2021 15:24  JOB #:  [id number]

## 2021-04-12 NOTE — SUICIDE SAFETY PLAN
SAFETY PLAN    A suicide Safety Plan is a document that supports someone when they are having thoughts of suicide. Warning Signs that indicate a suicidal crisis may be developing: What (situations, thoughts, feelings, body sensations, behaviors, etc.) do you experience that lets you know you are beginning to think about suicide? 1. Depression  2. Losing hope  3. Turning to drugs    Internal Coping Strategies:  What things can I do (relaxation techniques, hobbies, physical activities, etc.) to take my mind off my problems without contacting another person? 1. Taking walks  2. Praying  3. Breathing techniques    People and social settings that provide distraction: Who can I call or where can I go to distract me? 1. Name: [facility]  2. Name: [facility]  3. Place: Go for a walk              People whom I can ask for help: Who can I call when I need help - for example, friends, family, clergy, someone else? 1. Name: Jyotsna Richey                  Phone: [phone]  2. Name: Justa Kowalski     Phone: [phone]      Professionals or [address] [place] [address] I can contact during a crisis: Who can I call for help - for example, my doctor, my psychiatrist, my psychologist, a mental health provider, a suicide hotline? 1. Patient is admitted for services with Crisis Stabilization in [address] [facility], [address] [place] [address],  [address] [phone]    2. Suicide Prevention Lifeline: 4-292-347-TALK (8231)    4. [address] [place] [address] Emergency Services -  for example, [facility] suicide hotline, [facility] Hotline:   1. [facility], [facility], [address]  Phone: [phone]    Making the environment safe: How can I make my environment (house/apartment/living space) safer? For example, can I remove guns, medications, and other items? 1.  Remove any excess medications

## 2021-04-12 NOTE — GROUP NOTE
BRENT  GROUP DOCUMENTATION INDIVIDUAL Group Therapy Note Date: 4/11/2021 Group Start Time: 2030 Group End Time: 2045 Group Topic: Medication SO CRESCENT BEH [facility] 1 SPECIAL TRT 1 Rebeca Green RN 
 
[facility] GROUP DOCUMENTATION GROUP Group Therapy Note Attendees:9 Attendance: Attended Patient's Goal:   importance of taking medication. Interventions/techniques: Informed Follows Directions: Followed directions Interactions: Interacted appropriately Mental Status: Calm Behavior/appearance: Cooperative Goals Achieved: Able to listen to others Additional Notes:   
 
Lorin Young RN

## 2021-04-12 NOTE — BSMART NOTE
SOCIAL WORK GROUP THERAPY PROGRESS NOTE Group Time:  10:45am 
 
Group Topic:  Coping Skills Group Participation: Pt was unavailable for group due to working with staff on d/c planning.

## 2021-04-12 NOTE — DISCHARGE INSTRUCTIONS
BEHAVIORAL HEALTH NURSING DISCHARGE NOTE      The following personal items collected during your admission are returned to you:   Dental Appliance: Dental Appliances: Uppers  Vision: Visual Aid: Glasses, With patient  Hearing Aid:    Jewelry: Jewelry: None  Clothing: Clothing: Footwear, Hat, Jacket/Coat, Pants, Shirt, Undergarments, Socks  Other Valuables: Other Valuables: Cell Phone, Angus Abilio, Lighter/matches, Money (comment), Personal electronic devices (comment), Wal[employer](24 cents, cord w/ port ,ear buds w/ extra earpieces)  Valuables sent to safe:        PATIENT INSTRUCTIONS:    Pt received discharge instructions, prescriptions, and emergency numbers. Pt completed satisfaction survey. All personal belongings returned to pt. Pt encouraged to follow-up with outpatient resources and to call with any questions or concerns. Patient is admitted for services with Crisis Stabilization in [address],  [address] [phone]  Staff will provide transportation. The discharge information has been reviewed with the patient. The patient verbalized understanding.

## 2021-04-13 ENCOUNTER — TELEPHONE (OUTPATIENT)
Dept: ADDICTION MEDICINE | Age: 51
End: 2021-04-13

## 2021-04-13 NOTE — TELEPHONE ENCOUNTER
This writer attempted to complete follow-up call at 1107 to phone number [phone] - phone number is not in service. No alternate phone number listed.

## 2021-08-20 ENCOUNTER — TRANSCRIBE ORDER (OUTPATIENT)
Dept: SCHEDULING | Age: 51
End: 2021-08-20

## 2021-08-20 DIAGNOSIS — M50.30 DEGENERATION OF CERVICAL INTERVERTEBRAL DISC: ICD-10-CM

## 2021-08-20 DIAGNOSIS — M51.36 DEGENERATION OF LUMBAR INTERVERTEBRAL DISC: Primary | ICD-10-CM

## 2021-08-20 DIAGNOSIS — M54.2 CERVICALGIA: ICD-10-CM

## 2021-08-20 DIAGNOSIS — M48.062 PSEUDOCLAUDICATION SYNDROME: ICD-10-CM

## 2021-09-19 ENCOUNTER — HOSPITAL ENCOUNTER (OUTPATIENT)
Dept: MRI IMAGING | Age: 51
Discharge: HOME | End: 2021-09-19
Attending: ORTHOPAEDIC SURGERY
Payer: MEDICAID

## 2021-09-19 DIAGNOSIS — M54.2 CERVICALGIA: ICD-10-CM

## 2021-09-19 DIAGNOSIS — M51.36 DEGENERATION OF LUMBAR INTERVERTEBRAL DISC: ICD-10-CM

## 2021-09-19 DIAGNOSIS — M50.30 DEGENERATION OF CERVICAL INTERVERTEBRAL DISC: ICD-10-CM

## 2021-09-19 DIAGNOSIS — M48.062 PSEUDOCLAUDICATION SYNDROME: ICD-10-CM

## 2021-09-19 PROCEDURE — 72141 MRI NECK SPINE W/O DYE: CPT

## 2021-09-19 PROCEDURE — 72148 MRI LUMBAR SPINE W/O DYE: CPT

## 2022-03-20 PROBLEM — F33.2 MDD (MAJOR DEPRESSIVE DISORDER), RECURRENT SEVERE, WITHOUT PSYCHOSIS (HCC): Status: ACTIVE | Noted: 2021-04-03

## 2023-05-12 RX ORDER — AMLODIPINE BESYLATE 5 MG/1
TABLET ORAL DAILY
COMMUNITY
Start: 2021-04-13

## 2023-05-12 RX ORDER — QUETIAPINE FUMARATE 100 MG/1
TABLET, FILM COATED ORAL
COMMUNITY
Start: 2021-04-12

## 2023-05-12 RX ORDER — PREGABALIN 150 MG/1
CAPSULE ORAL 2 TIMES DAILY
COMMUNITY

## 2023-05-12 RX ORDER — MIRTAZAPINE 15 MG/1
TABLET, FILM COATED ORAL
COMMUNITY
Start: 2021-04-12

## 2023-05-12 RX ORDER — VENLAFAXINE HYDROCHLORIDE 75 MG/1
CAPSULE, EXTENDED RELEASE ORAL
COMMUNITY
Start: 2021-04-13

## 2023-05-12 RX ORDER — CARISOPRODOL 350 MG/1
TABLET ORAL 4 TIMES DAILY PRN
COMMUNITY
Start: 2021-04-12

## 2023-05-12 RX ORDER — ROPINIROLE 0.5 MG/1
TABLET, FILM COATED ORAL
COMMUNITY
Start: 2021-04-12